# Patient Record
Sex: MALE | Race: WHITE | Employment: OTHER | URBAN - METROPOLITAN AREA
[De-identification: names, ages, dates, MRNs, and addresses within clinical notes are randomized per-mention and may not be internally consistent; named-entity substitution may affect disease eponyms.]

---

## 2024-09-16 ENCOUNTER — APPOINTMENT (OUTPATIENT)
Dept: CT IMAGING | Age: 84
DRG: 084 | End: 2024-09-16
Payer: MEDICARE

## 2024-09-16 ENCOUNTER — APPOINTMENT (OUTPATIENT)
Dept: GENERAL RADIOLOGY | Age: 84
DRG: 084 | End: 2024-09-16
Payer: MEDICARE

## 2024-09-16 ENCOUNTER — HOSPITAL ENCOUNTER (INPATIENT)
Age: 84
LOS: 1 days | Discharge: NURSING, REHAB OR LONG-TERM CARE | DRG: 084 | End: 2024-09-18
Attending: EMERGENCY MEDICINE | Admitting: INTERNAL MEDICINE
Payer: MEDICARE

## 2024-09-16 DIAGNOSIS — T14.8XXA MULTIPLE SKIN TEARS: ICD-10-CM

## 2024-09-16 DIAGNOSIS — S01.81XA FACIAL LACERATION, INITIAL ENCOUNTER: ICD-10-CM

## 2024-09-16 DIAGNOSIS — I60.9 SUBARACHNOID HEMORRHAGE (HCC): ICD-10-CM
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
## 2024-09-16 DIAGNOSIS — I71.40 ABDOMINAL AORTIC ANEURYSM (AAA) GREATER THAN 5.5 CM IN DIAMETER IN MALE (HCC): ICD-10-CM

## 2024-09-16 DIAGNOSIS — F03.C0 SEVERE DEMENTIA WITHOUT BEHAVIORAL DISTURBANCE, PSYCHOTIC DISTURBANCE, MOOD DISTURBANCE, OR ANXIETY, UNSPECIFIED DEMENTIA TYPE (HCC): ICD-10-CM

## 2024-09-16 DIAGNOSIS — S09.90XA CLOSED HEAD INJURY, INITIAL ENCOUNTER: ICD-10-CM

## 2024-09-16 LAB
ALBUMIN SERPL-MCNC: 3.6 G/DL (ref 3.4–5)
ALBUMIN/GLOB SERPL: 1.4 {RATIO} (ref 1.1–2.2)
ALP SERPL-CCNC: 91 U/L (ref 40–129)
ALT SERPL-CCNC: 7 U/L (ref 10–40)
ANION GAP SERPL CALCULATED.3IONS-SCNC: 12 MMOL/L (ref 3–16)
AST SERPL-CCNC: 25 U/L (ref 15–37)
BACTERIA URNS QL MICRO: ABNORMAL /HPF
BASOPHILS # BLD: 0 K/UL (ref 0–0.2)
BASOPHILS NFR BLD: 0.5 %
BILIRUB SERPL-MCNC: 0.4 MG/DL (ref 0–1)
BILIRUB UR QL STRIP.AUTO: NEGATIVE
BUN SERPL-MCNC: 12 MG/DL (ref 7–20)
CALCIUM SERPL-MCNC: 9.3 MG/DL (ref 8.3–10.6)
CHLORIDE SERPL-SCNC: 102 MMOL/L (ref 99–110)
CLARITY UR: CLEAR
CO2 SERPL-SCNC: 23 MMOL/L (ref 21–32)
COLOR UR: YELLOW
CREAT SERPL-MCNC: 0.6 MG/DL (ref 0.8–1.3)
DEPRECATED RDW RBC AUTO: 18.9 % (ref 12.4–15.4)
EOSINOPHIL # BLD: 0.1 K/UL (ref 0–0.6)
EOSINOPHIL NFR BLD: 1.1 %
EPI CELLS #/AREA URNS AUTO: 1 /HPF (ref 0–5)
GFR SERPLBLD CREATININE-BSD FMLA CKD-EPI: >90 ML/MIN/{1.73_M2}
GLUCOSE SERPL-MCNC: 122 MG/DL (ref 70–99)
GLUCOSE UR STRIP.AUTO-MCNC: NEGATIVE MG/DL
HCT VFR BLD AUTO: 33.5 % (ref 40.5–52.5)
HGB BLD-MCNC: 10.4 G/DL (ref 13.5–17.5)
HGB UR QL STRIP.AUTO: NEGATIVE
HYALINE CASTS #/AREA URNS AUTO: 1 /LPF (ref 0–8)
KETONES UR STRIP.AUTO-MCNC: ABNORMAL MG/DL
LEUKOCYTE ESTERASE UR QL STRIP.AUTO: NEGATIVE
LYMPHOCYTES # BLD: 1.2 K/UL (ref 1–5.1)
LYMPHOCYTES NFR BLD: 16.5 %
MCH RBC QN AUTO: 23.9 PG (ref 26–34)
MCHC RBC AUTO-ENTMCNC: 31.1 G/DL (ref 31–36)
MCV RBC AUTO: 77 FL (ref 80–100)
MONOCYTES # BLD: 0.5 K/UL (ref 0–1.3)
MONOCYTES NFR BLD: 6 %
MUCUS: PRESENT
NEUTROPHILS # BLD: 5.7 K/UL (ref 1.7–7.7)
NEUTROPHILS NFR BLD: 75.9 %
NITRITE UR QL STRIP.AUTO: NEGATIVE
PH UR STRIP.AUTO: 6 [PH] (ref 5–8)
PLATELET # BLD AUTO: 249 K/UL (ref 135–450)
PMV BLD AUTO: 7.8 FL (ref 5–10.5)
POTASSIUM SERPL-SCNC: 4.5 MMOL/L (ref 3.5–5.1)
PROT SERPL-MCNC: 6.2 G/DL (ref 6.4–8.2)
PROT UR STRIP.AUTO-MCNC: ABNORMAL MG/DL
RBC # BLD AUTO: 4.35 M/UL (ref 4.2–5.9)
RBC CLUMPS #/AREA URNS AUTO: 3 /HPF (ref 0–4)
SODIUM SERPL-SCNC: 137 MMOL/L (ref 136–145)
SP GR UR STRIP.AUTO: 1.01 (ref 1–1.03)
UA COMPLETE W REFLEX CULTURE PNL UR: ABNORMAL
UA DIPSTICK W REFLEX MICRO PNL UR: YES
URN SPEC COLLECT METH UR: ABNORMAL
UROBILINOGEN UR STRIP-ACNC: 1 E.U./DL
WBC # BLD AUTO: 7.5 K/UL (ref 4–11)
WBC #/AREA URNS AUTO: 5 /HPF (ref 0–5)

## 2024-09-16 PROCEDURE — 6360000002 HC RX W HCPCS: Performed by: EMERGENCY MEDICINE

## 2024-09-16 PROCEDURE — 70450 CT HEAD/BRAIN W/O DYE: CPT

## 2024-09-16 PROCEDURE — 99285 EMERGENCY DEPT VISIT HI MDM: CPT

## 2024-09-16 PROCEDURE — 72125 CT NECK SPINE W/O DYE: CPT

## 2024-09-16 PROCEDURE — 71250 CT THORAX DX C-: CPT

## 2024-09-16 PROCEDURE — 6370000000 HC RX 637 (ALT 250 FOR IP): Performed by: EMERGENCY MEDICINE

## 2024-09-16 PROCEDURE — 6360000004 HC RX CONTRAST MEDICATION: Performed by: EMERGENCY MEDICINE

## 2024-09-16 PROCEDURE — 81001 URINALYSIS AUTO W/SCOPE: CPT

## 2024-09-16 PROCEDURE — G0378 HOSPITAL OBSERVATION PER HR: HCPCS

## 2024-09-16 PROCEDURE — 90471 IMMUNIZATION ADMIN: CPT | Performed by: EMERGENCY MEDICINE

## 2024-09-16 PROCEDURE — 85025 COMPLETE CBC W/AUTO DIFF WBC: CPT

## 2024-09-16 PROCEDURE — 80053 COMPREHEN METABOLIC PANEL: CPT

## 2024-09-16 PROCEDURE — 73080 X-RAY EXAM OF ELBOW: CPT

## 2024-09-16 PROCEDURE — 90715 TDAP VACCINE 7 YRS/> IM: CPT | Performed by: EMERGENCY MEDICINE

## 2024-09-16 PROCEDURE — 74174 CTA ABD&PLVS W/CONTRAST: CPT

## 2024-09-16 PROCEDURE — 71045 X-RAY EXAM CHEST 1 VIEW: CPT

## 2024-09-16 RX ORDER — IOPAMIDOL 755 MG/ML
75 INJECTION, SOLUTION INTRAVASCULAR
Status: COMPLETED | OUTPATIENT
Start: 2024-09-16 | End: 2024-09-16

## 2024-09-16 RX ORDER — POLYETHYLENE GLYCOL 3350 17 G/17G
17 POWDER, FOR SOLUTION ORAL DAILY PRN
Status: DISCONTINUED | OUTPATIENT
Start: 2024-09-16 | End: 2024-09-18 | Stop reason: HOSPADM

## 2024-09-16 RX ORDER — SODIUM CHLORIDE 0.9 % (FLUSH) 0.9 %
5-40 SYRINGE (ML) INJECTION PRN
Status: DISCONTINUED | OUTPATIENT
Start: 2024-09-16 | End: 2024-09-18 | Stop reason: HOSPADM

## 2024-09-16 RX ORDER — MAGNESIUM SULFATE IN WATER 40 MG/ML
2000 INJECTION, SOLUTION INTRAVENOUS PRN
Status: DISCONTINUED | OUTPATIENT
Start: 2024-09-16 | End: 2024-09-18 | Stop reason: HOSPADM

## 2024-09-16 RX ORDER — ACETAMINOPHEN 650 MG/1
650 SUPPOSITORY RECTAL EVERY 6 HOURS PRN
Status: DISCONTINUED | OUTPATIENT
Start: 2024-09-16 | End: 2024-09-18 | Stop reason: HOSPADM

## 2024-09-16 RX ORDER — SODIUM CHLORIDE 9 MG/ML
INJECTION, SOLUTION INTRAVENOUS PRN
Status: DISCONTINUED | OUTPATIENT
Start: 2024-09-16 | End: 2024-09-18 | Stop reason: HOSPADM

## 2024-09-16 RX ORDER — ONDANSETRON 2 MG/ML
4 INJECTION INTRAMUSCULAR; INTRAVENOUS EVERY 6 HOURS PRN
Status: DISCONTINUED | OUTPATIENT
Start: 2024-09-16 | End: 2024-09-18 | Stop reason: HOSPADM

## 2024-09-16 RX ORDER — SODIUM CHLORIDE 0.9 % (FLUSH) 0.9 %
5-40 SYRINGE (ML) INJECTION EVERY 12 HOURS SCHEDULED
Status: DISCONTINUED | OUTPATIENT
Start: 2024-09-17 | End: 2024-09-18 | Stop reason: HOSPADM

## 2024-09-16 RX ORDER — POTASSIUM CHLORIDE 7.45 MG/ML
10 INJECTION INTRAVENOUS PRN
Status: DISCONTINUED | OUTPATIENT
Start: 2024-09-16 | End: 2024-09-18 | Stop reason: HOSPADM

## 2024-09-16 RX ORDER — ACETAMINOPHEN 325 MG/1
650 TABLET ORAL EVERY 6 HOURS PRN
Status: DISCONTINUED | OUTPATIENT
Start: 2024-09-16 | End: 2024-09-18 | Stop reason: HOSPADM

## 2024-09-16 RX ORDER — POTASSIUM CHLORIDE 1500 MG/1
40 TABLET, EXTENDED RELEASE ORAL PRN
Status: DISCONTINUED | OUTPATIENT
Start: 2024-09-16 | End: 2024-09-18 | Stop reason: HOSPADM

## 2024-09-16 RX ORDER — NEOMYCIN/BACITRACIN/POLYMYXINB 3.5-400-5K
OINTMENT (GRAM) TOPICAL 2 TIMES DAILY
Status: DISCONTINUED | OUTPATIENT
Start: 2024-09-16 | End: 2024-09-18 | Stop reason: HOSPADM

## 2024-09-16 RX ORDER — ONDANSETRON 4 MG/1
4 TABLET, ORALLY DISINTEGRATING ORAL EVERY 8 HOURS PRN
Status: DISCONTINUED | OUTPATIENT
Start: 2024-09-16 | End: 2024-09-18 | Stop reason: HOSPADM

## 2024-09-16 RX ADMIN — BACITRACIN ZINC, NEOMYCIN, POLYMYXIN B SULFAT: 5000; 3.5; 4 OINTMENT TOPICAL at 22:28

## 2024-09-16 RX ADMIN — IOPAMIDOL 75 ML: 755 INJECTION, SOLUTION INTRAVENOUS at 16:34

## 2024-09-16 RX ADMIN — TETANUS TOXOID, REDUCED DIPHTHERIA TOXOID AND ACELLULAR PERTUSSIS VACCINE, ADSORBED 0.5 ML: 5; 2.5; 8; 8; 2.5 SUSPENSION INTRAMUSCULAR at 22:29

## 2024-09-16 ASSESSMENT — PAIN - FUNCTIONAL ASSESSMENT: PAIN_FUNCTIONAL_ASSESSMENT: 0-10

## 2024-09-16 ASSESSMENT — PAIN SCALES - GENERAL: PAINLEVEL_OUTOF10: 0

## 2024-09-17 ENCOUNTER — APPOINTMENT (OUTPATIENT)
Dept: GENERAL RADIOLOGY | Age: 84
DRG: 084 | End: 2024-09-17
Payer: MEDICARE

## 2024-09-17 ENCOUNTER — APPOINTMENT (OUTPATIENT)
Dept: CT IMAGING | Age: 84
DRG: 084 | End: 2024-09-17
Payer: MEDICARE

## 2024-09-17 LAB
ALBUMIN SERPL-MCNC: 3.5 G/DL (ref 3.4–5)
ALP SERPL-CCNC: 83 U/L (ref 40–129)
ALT SERPL-CCNC: 7 U/L (ref 10–40)
ANION GAP SERPL CALCULATED.3IONS-SCNC: 10 MMOL/L (ref 3–16)
AST SERPL-CCNC: 22 U/L (ref 15–37)
BASOPHILS # BLD: 0 K/UL (ref 0–0.2)
BASOPHILS NFR BLD: 0.4 %
BILIRUB DIRECT SERPL-MCNC: 0.2 MG/DL (ref 0–0.3)
BILIRUB INDIRECT SERPL-MCNC: 0.4 MG/DL (ref 0–1)
BILIRUB SERPL-MCNC: 0.6 MG/DL (ref 0–1)
BUN SERPL-MCNC: 13 MG/DL (ref 7–20)
CALCIUM SERPL-MCNC: 8.9 MG/DL (ref 8.3–10.6)
CHLORIDE SERPL-SCNC: 102 MMOL/L (ref 99–110)
CO2 SERPL-SCNC: 24 MMOL/L (ref 21–32)
CREAT SERPL-MCNC: <0.5 MG/DL (ref 0.8–1.3)
DEPRECATED RDW RBC AUTO: 18.5 % (ref 12.4–15.4)
EOSINOPHIL # BLD: 0 K/UL (ref 0–0.6)
EOSINOPHIL NFR BLD: 0.4 %
GFR SERPLBLD CREATININE-BSD FMLA CKD-EPI: >90 ML/MIN/{1.73_M2}
GLUCOSE SERPL-MCNC: 113 MG/DL (ref 70–99)
HCT VFR BLD AUTO: 28.7 % (ref 40.5–52.5)
HGB BLD-MCNC: 9.5 G/DL (ref 13.5–17.5)
LYMPHOCYTES # BLD: 2 K/UL (ref 1–5.1)
LYMPHOCYTES NFR BLD: 20.1 %
MCH RBC QN AUTO: 24.7 PG (ref 26–34)
MCHC RBC AUTO-ENTMCNC: 33.1 G/DL (ref 31–36)
MCV RBC AUTO: 74.7 FL (ref 80–100)
MONOCYTES # BLD: 0.9 K/UL (ref 0–1.3)
MONOCYTES NFR BLD: 9.2 %
NEUTROPHILS # BLD: 6.9 K/UL (ref 1.7–7.7)
NEUTROPHILS NFR BLD: 69.9 %
PLATELET # BLD AUTO: 201 K/UL (ref 135–450)
PMV BLD AUTO: 7.4 FL (ref 5–10.5)
POTASSIUM SERPL-SCNC: 3.9 MMOL/L (ref 3.5–5.1)
PROT SERPL-MCNC: 6 G/DL (ref 6.4–8.2)
RBC # BLD AUTO: 3.84 M/UL (ref 4.2–5.9)
SODIUM SERPL-SCNC: 136 MMOL/L (ref 136–145)
WBC # BLD AUTO: 9.8 K/UL (ref 4–11)

## 2024-09-17 PROCEDURE — 74230 X-RAY XM SWLNG FUNCJ C+: CPT

## 2024-09-17 PROCEDURE — 92610 EVALUATE SWALLOWING FUNCTION: CPT

## 2024-09-17 PROCEDURE — 97116 GAIT TRAINING THERAPY: CPT | Performed by: PHYSICAL THERAPIST

## 2024-09-17 PROCEDURE — 97129 THER IVNTJ 1ST 15 MIN: CPT

## 2024-09-17 PROCEDURE — 80048 BASIC METABOLIC PNL TOTAL CA: CPT

## 2024-09-17 PROCEDURE — 70450 CT HEAD/BRAIN W/O DYE: CPT

## 2024-09-17 PROCEDURE — 2580000003 HC RX 258: Performed by: HOSPITALIST

## 2024-09-17 PROCEDURE — 6370000000 HC RX 637 (ALT 250 FOR IP): Performed by: INTERNAL MEDICINE

## 2024-09-17 PROCEDURE — 92611 MOTION FLUOROSCOPY/SWALLOW: CPT

## 2024-09-17 PROCEDURE — 97162 PT EVAL MOD COMPLEX 30 MIN: CPT | Performed by: PHYSICAL THERAPIST

## 2024-09-17 PROCEDURE — 97166 OT EVAL MOD COMPLEX 45 MIN: CPT

## 2024-09-17 PROCEDURE — 36415 COLL VENOUS BLD VENIPUNCTURE: CPT

## 2024-09-17 PROCEDURE — 85025 COMPLETE CBC W/AUTO DIFF WBC: CPT

## 2024-09-17 PROCEDURE — G0378 HOSPITAL OBSERVATION PER HR: HCPCS

## 2024-09-17 PROCEDURE — 94760 N-INVAS EAR/PLS OXIMETRY 1: CPT

## 2024-09-17 PROCEDURE — 92523 SPEECH SOUND LANG COMPREHEN: CPT

## 2024-09-17 PROCEDURE — 6370000000 HC RX 637 (ALT 250 FOR IP): Performed by: HOSPITALIST

## 2024-09-17 PROCEDURE — 80076 HEPATIC FUNCTION PANEL: CPT

## 2024-09-17 RX ORDER — LISINOPRIL 20 MG/1
20 TABLET ORAL DAILY
Status: DISCONTINUED | OUTPATIENT
Start: 2024-09-17 | End: 2024-09-18 | Stop reason: HOSPADM

## 2024-09-17 RX ORDER — AMLODIPINE BESYLATE 5 MG/1
5 TABLET ORAL DAILY
Status: DISCONTINUED | OUTPATIENT
Start: 2024-09-17 | End: 2024-09-18 | Stop reason: HOSPADM

## 2024-09-17 RX ORDER — ATORVASTATIN CALCIUM 80 MG/1
80 TABLET, FILM COATED ORAL DAILY
Status: ON HOLD | COMMUNITY
End: 2024-09-17

## 2024-09-17 RX ORDER — LISINOPRIL AND HYDROCHLOROTHIAZIDE 12.5; 2 MG/1; MG/1
1 TABLET ORAL DAILY
Status: ON HOLD | COMMUNITY
End: 2024-09-17

## 2024-09-17 RX ORDER — AMLODIPINE BESYLATE 5 MG/1
5 TABLET ORAL DAILY
Status: ON HOLD | COMMUNITY
End: 2024-09-17

## 2024-09-17 RX ORDER — FUROSEMIDE 20 MG
20 TABLET ORAL 2 TIMES DAILY
Status: ON HOLD | COMMUNITY
End: 2024-09-17

## 2024-09-17 RX ORDER — DOXYCYCLINE 100 MG/1
100 CAPSULE ORAL 2 TIMES DAILY
Status: ON HOLD | COMMUNITY
End: 2024-09-17

## 2024-09-17 RX ORDER — ASPIRIN 81 MG/1
81 TABLET, CHEWABLE ORAL DAILY
Status: ON HOLD | COMMUNITY
End: 2024-09-17

## 2024-09-17 RX ORDER — ESCITALOPRAM OXALATE 10 MG/1
10 TABLET ORAL DAILY
Status: ON HOLD | COMMUNITY
End: 2024-09-17

## 2024-09-17 RX ORDER — M-VIT,TX,IRON,MINS/CALC/FOLIC 27MG-0.4MG
1 TABLET ORAL DAILY
Status: ON HOLD | COMMUNITY
End: 2024-09-17

## 2024-09-17 RX ADMIN — BACITRACIN ZINC, NEOMYCIN, POLYMYXIN B SULFAT: 5000; 3.5; 4 OINTMENT TOPICAL at 20:10

## 2024-09-17 RX ADMIN — BACITRACIN ZINC, NEOMYCIN, POLYMYXIN B SULFAT: 5000; 3.5; 4 OINTMENT TOPICAL at 09:01

## 2024-09-17 RX ADMIN — AMLODIPINE BESYLATE 5 MG: 5 TABLET ORAL at 10:24

## 2024-09-17 RX ADMIN — Medication 10 ML: at 20:11

## 2024-09-17 RX ADMIN — Medication 10 ML: at 07:54

## 2024-09-17 RX ADMIN — LISINOPRIL 20 MG: 20 TABLET ORAL at 10:24

## 2024-09-17 ASSESSMENT — PAIN SCALES - WONG BAKER: WONGBAKER_NUMERICALRESPONSE: NO HURT

## 2024-09-18 VITALS
DIASTOLIC BLOOD PRESSURE: 71 MMHG | HEIGHT: 75 IN | SYSTOLIC BLOOD PRESSURE: 133 MMHG | BODY MASS INDEX: 14.99 KG/M2 | WEIGHT: 120.59 LBS | OXYGEN SATURATION: 96 % | TEMPERATURE: 97.5 F | RESPIRATION RATE: 14 BRPM | HEART RATE: 56 BPM

## 2024-09-18 PROBLEM — I60.9 SUBARACHNOID HEMORRHAGE (HCC): Status: ACTIVE | Noted: 2024-09-18

## 2024-09-18 PROCEDURE — 97116 GAIT TRAINING THERAPY: CPT | Performed by: PHYSICAL THERAPIST

## 2024-09-18 PROCEDURE — 99232 SBSQ HOSP IP/OBS MODERATE 35: CPT | Performed by: SURGERY

## 2024-09-18 PROCEDURE — 94760 N-INVAS EAR/PLS OXIMETRY 1: CPT

## 2024-09-18 PROCEDURE — 1200000000 HC SEMI PRIVATE

## 2024-09-18 PROCEDURE — 97530 THERAPEUTIC ACTIVITIES: CPT | Performed by: PHYSICAL THERAPIST

## 2024-09-18 PROCEDURE — 6370000000 HC RX 637 (ALT 250 FOR IP): Performed by: INTERNAL MEDICINE

## 2024-09-18 PROCEDURE — 2580000003 HC RX 258: Performed by: HOSPITALIST

## 2024-09-18 PROCEDURE — 6370000000 HC RX 637 (ALT 250 FOR IP): Performed by: HOSPITALIST

## 2024-09-18 RX ORDER — LISINOPRIL 10 MG/1
10 TABLET ORAL DAILY
Qty: 30 TABLET | Refills: 2 | Status: SHIPPED | OUTPATIENT
Start: 2024-09-19

## 2024-09-18 RX ADMIN — Medication 20 ML: at 08:57

## 2024-09-18 RX ADMIN — LISINOPRIL 20 MG: 20 TABLET ORAL at 08:55

## 2024-09-18 RX ADMIN — AMLODIPINE BESYLATE 5 MG: 5 TABLET ORAL at 08:55

## 2024-09-18 RX ADMIN — BACITRACIN ZINC, NEOMYCIN, POLYMYXIN B SULFAT: 5000; 3.5; 4 OINTMENT TOPICAL at 08:55

## 2024-09-18 ASSESSMENT — PAIN SCALES - GENERAL: PAINLEVEL_OUTOF10: 0

## 2024-09-18 ASSESSMENT — PAIN SCALES - WONG BAKER: WONGBAKER_NUMERICALRESPONSE: NO HURT

## 2024-10-03 ENCOUNTER — TELEPHONE (OUTPATIENT)
Dept: VASCULAR SURGERY | Age: 84
End: 2024-10-03

## 2024-10-03 NOTE — TELEPHONE ENCOUNTER
Patient's nephew called to schedule an appt with Dr Wheeler. Patient saw him in the hospital and the nephew has a lot of questions. Please reach out to Konrad at [phone].

## 2024-10-23 ENCOUNTER — OFFICE VISIT (OUTPATIENT)
Dept: VASCULAR SURGERY | Age: 84
End: 2024-10-23
Payer: MEDICARE

## 2024-10-23 VITALS
WEIGHT: 128 LBS | BODY MASS INDEX: 15.92 KG/M2 | HEART RATE: 62 BPM | HEIGHT: 75 IN | DIASTOLIC BLOOD PRESSURE: 61 MMHG | SYSTOLIC BLOOD PRESSURE: 141 MMHG

## 2024-10-23 DIAGNOSIS — I71.43 INFRARENAL ABDOMINAL AORTIC ANEURYSM (AAA) WITHOUT RUPTURE (HCC): Primary | ICD-10-CM

## 2024-10-23 PROCEDURE — 1123F ACP DISCUSS/DSCN MKR DOCD: CPT | Performed by: SURGERY

## 2024-10-23 PROCEDURE — 1159F MED LIST DOCD IN RCRD: CPT | Performed by: SURGERY

## 2024-10-23 PROCEDURE — 99213 OFFICE O/P EST LOW 20 MIN: CPT | Performed by: SURGERY

## 2024-10-23 PROCEDURE — G8427 DOCREV CUR MEDS BY ELIG CLIN: HCPCS | Performed by: SURGERY

## 2024-10-23 PROCEDURE — 1036F TOBACCO NON-USER: CPT | Performed by: SURGERY

## 2024-10-23 PROCEDURE — G8419 CALC BMI OUT NRM PARAM NOF/U: HCPCS | Performed by: SURGERY

## 2024-10-23 PROCEDURE — G8484 FLU IMMUNIZE NO ADMIN: HCPCS | Performed by: SURGERY

## 2024-10-23 NOTE — PROGRESS NOTES
F/U appt after inpt consultation for AAA. 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  Pt denies abdominal pain.  Ongoing dementia living in a memory care unit.  Accompanied today by his nephew.  While hospitalized decisions were discussed with his son Fabian.    Past Medical History:   Diagnosis Date    Smoking 08/27/2024    POA provided information     No past surgical history on file.  No Known Allergies  Current Outpatient Medications   Medication Sig Dispense Refill    lisinopril (PRINIVIL;ZESTRIL) 10 MG tablet Take 1 tablet by mouth daily 30 tablet 2     No current facility-administered medications for this visit.     Social History     Socioeconomic History    Marital status: Unknown     Spouse name: Not on file    Number of children: Not on file    Years of education: Not on file    Highest education level: Not on file   Occupational History    Not on file   Tobacco Use    Smoking status: Former     Types: Cigarettes    Smokeless tobacco: Never   Substance and Sexual Activity    Alcohol use: Not on file    Drug use: Not on file    Sexual activity: Not on file   Other Topics Concern    Not on file   Social History Narrative    Not on file     Social Determinants of Health     Financial Resource Strain: Not on file   Food Insecurity: Unknown (1/21/2024)    Received from [facility]    Food Insecurities     Worried about running out of food: Not on file     Food Bought: Not on file   Transportation Needs: Unknown (1/21/2024)    Received from [facility]    Transportation     Worried about transportation: Not on file   Physical Activity: Not on file   Stress: Not on file   Social Connections: Not on file   Intimate Partner Violence: Unknown (1/21/2024)    Received from [facility]    Interpersonal Safety     Feel physically or emotionally unsafe where currently live: Not on file     Harm by anyone: Not on file

## 2024-10-30 ENCOUNTER — TELEPHONE (OUTPATIENT)
Dept: VASCULAR SURGERY | Age: 84
End: 2024-10-30

## 2024-10-30 NOTE — TELEPHONE ENCOUNTER
Pt's nephew, Konrad, called with their decision regarding the pt's care. Per Konrad, he has discussed this at length with pt's family and they have decided to take Dr. Wheeler's recommendation and not proceed with any treatment at this time. I advised Konrad to call us back should they need anything in the future.

## 2024-12-25 ENCOUNTER — APPOINTMENT (OUTPATIENT)
Dept: CT IMAGING | Age: 84
End: 2024-12-25
Payer: MEDICARE

## 2024-12-25 ENCOUNTER — HOSPITAL ENCOUNTER (EMERGENCY)
Age: 84
Discharge: HOME | End: 2024-12-25
Attending: STUDENT IN AN ORGANIZED HEALTH CARE EDUCATION/TRAINING PROGRAM
Payer: MEDICARE

## 2024-12-25 ENCOUNTER — APPOINTMENT (OUTPATIENT)
Dept: GENERAL RADIOLOGY | Age: 84
End: 2024-12-25
Payer: MEDICARE

## 2024-12-25 VITALS
HEIGHT: 75 IN | TEMPERATURE: 98 F | SYSTOLIC BLOOD PRESSURE: 159 MMHG | WEIGHT: 144.18 LBS | HEART RATE: 62 BPM | RESPIRATION RATE: 15 BRPM | BODY MASS INDEX: 17.93 KG/M2 | DIASTOLIC BLOOD PRESSURE: 73 MMHG | OXYGEN SATURATION: 98 %

## 2024-12-25 DIAGNOSIS — Z86.59 HISTORY OF DEMENTIA: ICD-10-CM
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
## 2024-12-25 LAB
ANION GAP SERPL CALCULATED.3IONS-SCNC: 10 MMOL/L (ref 3–16)
BASOPHILS # BLD: 0 K/UL (ref 0–0.2)
BASOPHILS NFR BLD: 0.4 %
BUN SERPL-MCNC: 20 MG/DL (ref 7–20)
CALCIUM SERPL-MCNC: 9.7 MG/DL (ref 8.3–10.6)
CHLORIDE SERPL-SCNC: 103 MMOL/L (ref 99–110)
CO2 SERPL-SCNC: 27 MMOL/L (ref 21–32)
CREAT SERPL-MCNC: 0.6 MG/DL (ref 0.8–1.3)
DEPRECATED RDW RBC AUTO: 18.7 % (ref 12.4–15.4)
EOSINOPHIL # BLD: 0.3 K/UL (ref 0–0.6)
EOSINOPHIL NFR BLD: 3.9 %
GFR SERPLBLD CREATININE-BSD FMLA CKD-EPI: >90 ML/MIN/{1.73_M2}
GLUCOSE SERPL-MCNC: 90 MG/DL (ref 70–99)
HCT VFR BLD AUTO: 40.9 % (ref 40.5–52.5)
HGB BLD-MCNC: 13 G/DL (ref 13.5–17.5)
LYMPHOCYTES # BLD: 3.3 K/UL (ref 1–5.1)
LYMPHOCYTES NFR BLD: 45 %
MCH RBC QN AUTO: 23.9 PG (ref 26–34)
MCHC RBC AUTO-ENTMCNC: 31.9 G/DL (ref 31–36)
MCV RBC AUTO: 75.1 FL (ref 80–100)
MONOCYTES # BLD: 0.6 K/UL (ref 0–1.3)
MONOCYTES NFR BLD: 7.9 %
NEUTROPHILS # BLD: 3.1 K/UL (ref 1.7–7.7)
NEUTROPHILS NFR BLD: 42.8 %
PLATELET # BLD AUTO: 230 K/UL (ref 135–450)
PMV BLD AUTO: 7.8 FL (ref 5–10.5)
POTASSIUM SERPL-SCNC: 4.3 MMOL/L (ref 3.5–5.1)
RBC # BLD AUTO: 5.44 M/UL (ref 4.2–5.9)
SODIUM SERPL-SCNC: 140 MMOL/L (ref 136–145)
WBC # BLD AUTO: 7.3 K/UL (ref 4–11)

## 2024-12-25 PROCEDURE — 80048 BASIC METABOLIC PNL TOTAL CA: CPT

## 2024-12-25 PROCEDURE — 70450 CT HEAD/BRAIN W/O DYE: CPT

## 2024-12-25 PROCEDURE — 85025 COMPLETE CBC W/AUTO DIFF WBC: CPT

## 2024-12-25 PROCEDURE — 99284 EMERGENCY DEPT VISIT MOD MDM: CPT

## 2024-12-25 PROCEDURE — 72170 X-RAY EXAM OF PELVIS: CPT

## 2024-12-25 ASSESSMENT — PAIN SCALES - GENERAL: PAINLEVEL_OUTOF10: 0

## 2024-12-25 ASSESSMENT — PAIN - FUNCTIONAL ASSESSMENT: PAIN_FUNCTIONAL_ASSESSMENT: 0-10

## 2024-12-25 ASSESSMENT — LIFESTYLE VARIABLES
HOW MANY STANDARD DRINKS CONTAINING ALCOHOL DO YOU HAVE ON A TYPICAL DAY: PATIENT DOES NOT DRINK
HOW OFTEN DO YOU HAVE A DRINK CONTAINING ALCOHOL: NEVER

## 2024-12-25 NOTE — DISCHARGE INSTRUCTIONS
You were seen today in the emergency department due to an accidental fall.  Your workup including blood work and CT scan and x-ray was reassuring and did not show any evidence of acute injury.  It is recommended that you continue to take your medications as prescribed.  Please follow-up with your regular doctor as scheduled.  Please return to the emergency department if you experience any further concerning symptoms.

## 2024-12-25 NOTE — ED PROVIDER NOTES
spent performing procedures).       DISCHARGE PRESCRIPTIONS: (None if blank)  New Prescriptions    No medications on file         I am the primary clinician of record.     FINAL IMPRESSION      1. Accidental fall, initial encounter    2. History of dementia            DISPOSITION/PLAN   DISPOSITION                 OUTPATIENT FOLLOW UP THE PATIENT:  No follow-up provider specified.      Electronically Signed: Alphonso Lui DO, 12/25/24, 9:33 AM    This report has been produced using speech recognition software and may contain errors related to that system including errors in grammar, punctuation, and spelling, as well as words and phrases that may be inappropriate. If there are any questions or concerns please feel free to contact the dictating provider for clarification.      Alphonso Lui,   12/26/24 0738

## 2025-04-16 ENCOUNTER — APPOINTMENT (OUTPATIENT)
Dept: GENERAL RADIOLOGY | Age: 85
End: 2025-04-16
Payer: MEDICARE

## 2025-04-16 ENCOUNTER — APPOINTMENT (OUTPATIENT)
Dept: CT IMAGING | Age: 85
End: 2025-04-16
Payer: MEDICARE

## 2025-04-16 ENCOUNTER — APPOINTMENT (OUTPATIENT)
Dept: CT IMAGING | Age: 85
DRG: 963 | End: 2025-04-16
Payer: MEDICARE

## 2025-04-16 ENCOUNTER — HOSPITAL ENCOUNTER (INPATIENT)
Age: 85
LOS: 5 days | Discharge: HOSPICE | DRG: 963 | End: 2025-04-21
Payer: MEDICARE

## 2025-04-16 ENCOUNTER — HOSPITAL ENCOUNTER (EMERGENCY)
Age: 85
Discharge: ANOTHER HOSPITAL | End: 2025-04-16
Attending: STUDENT IN AN ORGANIZED HEALTH CARE EDUCATION/TRAINING PROGRAM
Payer: MEDICARE

## 2025-04-16 DIAGNOSIS — R93.89 ABNORMAL FINDING ON CT SCAN: ICD-10-CM

## 2025-04-16 DIAGNOSIS — S09.90XA CLOSED HEAD INJURY, INITIAL ENCOUNTER: Primary | ICD-10-CM

## 2025-04-16 DIAGNOSIS — R74.8 ELEVATED CK: ICD-10-CM

## 2025-04-16 DIAGNOSIS — Z51.5 HOSPICE CARE PATIENT: ICD-10-CM

## 2025-04-16 DIAGNOSIS — S06.5XAA SUBDURAL HEMATOMA (HCC): ICD-10-CM

## 2025-04-16 DIAGNOSIS — I60.9 SAH (SUBARACHNOID HEMORRHAGE) (HCC): Primary | ICD-10-CM

## 2025-04-16 DIAGNOSIS — R79.89 ELEVATED TROPONIN I LEVEL: ICD-10-CM

## 2025-04-16 DIAGNOSIS — Z71.89 GOALS OF CARE, COUNSELING/DISCUSSION: ICD-10-CM

## 2025-04-16 DIAGNOSIS — S06.33AA: ICD-10-CM

## 2025-04-16 DIAGNOSIS — I62.9 INTRACRANIAL BLEED (HCC): ICD-10-CM

## 2025-04-16 LAB
ALBUMIN SERPL-MCNC: 4.1 G/DL (ref 3.4–5)
ALBUMIN/GLOB SERPL: 1.4 {RATIO} (ref 1.1–2.2)
ALP SERPL-CCNC: 107 U/L (ref 40–129)
ALT SERPL-CCNC: 41 U/L (ref 10–40)
ANION GAP SERPL CALCULATED.3IONS-SCNC: 15 MMOL/L (ref 3–16)
AST SERPL-CCNC: 103 U/L (ref 15–37)
BASOPHILS # BLD: 0 K/UL (ref 0–0.2)
BASOPHILS NFR BLD: 0.1 %
BILIRUB SERPL-MCNC: 0.8 MG/DL (ref 0–1)
BUN SERPL-MCNC: 28 MG/DL (ref 7–20)
CALCIUM SERPL-MCNC: 9.8 MG/DL (ref 8.3–10.6)
CHLORIDE SERPL-SCNC: 100 MMOL/L (ref 99–110)
CK SERPL-CCNC: 1029 U/L (ref 39–308)
CO2 SERPL-SCNC: 24 MMOL/L (ref 21–32)
CREAT SERPL-MCNC: 0.8 MG/DL (ref 0.8–1.3)
CRP SERPL-MCNC: 38.7 MG/L (ref 0–5.1)
DEPRECATED RDW RBC AUTO: 19 % (ref 12.4–15.4)
EKG ATRIAL RATE: 72 BPM
EKG DIAGNOSIS: NORMAL
EKG P AXIS: 85 DEGREES
EKG P-R INTERVAL: 180 MS
EKG Q-T INTERVAL: 448 MS
EKG QRS DURATION: 106 MS
EKG QTC CALCULATION (BAZETT): 490 MS
EKG R AXIS: 88 DEGREES
EKG T AXIS: 89 DEGREES
EKG VENTRICULAR RATE: 72 BPM
EOSINOPHIL # BLD: 0 K/UL (ref 0–0.6)
EOSINOPHIL NFR BLD: 0 %
GFR SERPLBLD CREATININE-BSD FMLA CKD-EPI: 88 ML/MIN/{1.73_M2}
GLUCOSE SERPL-MCNC: 153 MG/DL (ref 70–99)
HCT VFR BLD AUTO: 36.7 % (ref 40.5–52.5)
HGB BLD-MCNC: 11.7 G/DL (ref 13.5–17.5)
LACTATE BLDV-SCNC: 3.3 MMOL/L (ref 0.4–1.9)
LYMPHOCYTES # BLD: 1 K/UL (ref 1–5.1)
LYMPHOCYTES NFR BLD: 7.8 %
MCH RBC QN AUTO: 25 PG (ref 26–34)
MCHC RBC AUTO-ENTMCNC: 31.8 G/DL (ref 31–36)
MCV RBC AUTO: 78.5 FL (ref 80–100)
MONOCYTES # BLD: 1.1 K/UL (ref 0–1.3)
MONOCYTES NFR BLD: 8.8 %
NEUTROPHILS # BLD: 10.5 K/UL (ref 1.7–7.7)
NEUTROPHILS NFR BLD: 83.3 %
PLATELET # BLD AUTO: 227 K/UL (ref 135–450)
PMV BLD AUTO: 7.3 FL (ref 5–10.5)
POTASSIUM SERPL-SCNC: 4.9 MMOL/L (ref 3.5–5.1)
PROCALCITONIN SERPL IA-MCNC: 0.4 NG/ML (ref 0–0.15)
PROT SERPL-MCNC: 7.1 G/DL (ref 6.4–8.2)
RBC # BLD AUTO: 4.67 M/UL (ref 4.2–5.9)
SODIUM SERPL-SCNC: 139 MMOL/L (ref 136–145)
TROPONIN, HIGH SENSITIVITY: 1203 NG/L (ref 0–22)
TROPONIN, HIGH SENSITIVITY: 1479 NG/L (ref 0–22)
WBC # BLD AUTO: 12.6 K/UL (ref 4–11)

## 2025-04-16 PROCEDURE — 70450 CT HEAD/BRAIN W/O DYE: CPT

## 2025-04-16 PROCEDURE — 2580000003 HC RX 258: Performed by: INTERNAL MEDICINE

## 2025-04-16 PROCEDURE — 84484 ASSAY OF TROPONIN QUANT: CPT

## 2025-04-16 PROCEDURE — 72125 CT NECK SPINE W/O DYE: CPT

## 2025-04-16 PROCEDURE — 6360000004 HC RX CONTRAST MEDICATION: Performed by: STUDENT IN AN ORGANIZED HEALTH CARE EDUCATION/TRAINING PROGRAM

## 2025-04-16 PROCEDURE — 83605 ASSAY OF LACTIC ACID: CPT

## 2025-04-16 PROCEDURE — 71260 CT THORAX DX C+: CPT

## 2025-04-16 PROCEDURE — 80053 COMPREHEN METABOLIC PANEL: CPT

## 2025-04-16 PROCEDURE — 96374 THER/PROPH/DIAG INJ IV PUSH: CPT

## 2025-04-16 PROCEDURE — 85025 COMPLETE CBC W/AUTO DIFF WBC: CPT

## 2025-04-16 PROCEDURE — 36415 COLL VENOUS BLD VENIPUNCTURE: CPT

## 2025-04-16 PROCEDURE — 93010 ELECTROCARDIOGRAM REPORT: CPT | Performed by: INTERNAL MEDICINE

## 2025-04-16 PROCEDURE — 73503 X-RAY EXAM HIP UNI 4/> VIEWS: CPT

## 2025-04-16 PROCEDURE — 2500000003 HC RX 250 WO HCPCS: Performed by: INTERNAL MEDICINE

## 2025-04-16 PROCEDURE — 6360000002 HC RX W HCPCS: Performed by: NURSE PRACTITIONER

## 2025-04-16 PROCEDURE — 93005 ELECTROCARDIOGRAM TRACING: CPT | Performed by: NURSE PRACTITIONER

## 2025-04-16 PROCEDURE — 96375 TX/PRO/DX INJ NEW DRUG ADDON: CPT

## 2025-04-16 PROCEDURE — 71101 X-RAY EXAM UNILAT RIBS/CHEST: CPT

## 2025-04-16 PROCEDURE — 86140 C-REACTIVE PROTEIN: CPT

## 2025-04-16 PROCEDURE — 2060000000 HC ICU INTERMEDIATE R&B

## 2025-04-16 PROCEDURE — 84145 PROCALCITONIN (PCT): CPT

## 2025-04-16 PROCEDURE — 99285 EMERGENCY DEPT VISIT HI MDM: CPT

## 2025-04-16 PROCEDURE — 82550 ASSAY OF CK (CPK): CPT

## 2025-04-16 PROCEDURE — 6370000000 HC RX 637 (ALT 250 FOR IP): Performed by: INTERNAL MEDICINE

## 2025-04-16 PROCEDURE — 94760 N-INVAS EAR/PLS OXIMETRY 1: CPT

## 2025-04-16 RX ORDER — SODIUM CHLORIDE 0.9 % (FLUSH) 0.9 %
5-40 SYRINGE (ML) INJECTION PRN
Status: DISCONTINUED | OUTPATIENT
Start: 2025-04-16 | End: 2025-04-21 | Stop reason: HOSPADM

## 2025-04-16 RX ORDER — ACETAMINOPHEN 325 MG/1
650 TABLET ORAL EVERY 6 HOURS PRN
Status: DISCONTINUED | OUTPATIENT
Start: 2025-04-16 | End: 2025-04-21 | Stop reason: HOSPADM

## 2025-04-16 RX ORDER — IOPAMIDOL 755 MG/ML
75 INJECTION, SOLUTION INTRAVASCULAR
Status: COMPLETED | OUTPATIENT
Start: 2025-04-16 | End: 2025-04-16

## 2025-04-16 RX ORDER — POLYETHYLENE GLYCOL 3350 17 G/17G
17 POWDER, FOR SOLUTION ORAL DAILY PRN
Status: DISCONTINUED | OUTPATIENT
Start: 2025-04-16 | End: 2025-04-21 | Stop reason: HOSPADM

## 2025-04-16 RX ORDER — ACETAMINOPHEN 650 MG/1
650 SUPPOSITORY RECTAL EVERY 6 HOURS PRN
Status: DISCONTINUED | OUTPATIENT
Start: 2025-04-16 | End: 2025-04-21 | Stop reason: HOSPADM

## 2025-04-16 RX ORDER — LORAZEPAM 2 MG/ML
0.5 INJECTION INTRAMUSCULAR
Status: DISCONTINUED | OUTPATIENT
Start: 2025-04-16 | End: 2025-04-18 | Stop reason: SDUPTHER

## 2025-04-16 RX ORDER — FENTANYL CITRATE 50 UG/ML
50 INJECTION, SOLUTION INTRAMUSCULAR; INTRAVENOUS ONCE
Status: COMPLETED | OUTPATIENT
Start: 2025-04-16 | End: 2025-04-16

## 2025-04-16 RX ORDER — SODIUM CHLORIDE 0.9 % (FLUSH) 0.9 %
5-40 SYRINGE (ML) INJECTION EVERY 12 HOURS SCHEDULED
Status: DISCONTINUED | OUTPATIENT
Start: 2025-04-16 | End: 2025-04-21 | Stop reason: HOSPADM

## 2025-04-16 RX ORDER — ONDANSETRON 4 MG/1
4 TABLET, ORALLY DISINTEGRATING ORAL EVERY 8 HOURS PRN
Status: DISCONTINUED | OUTPATIENT
Start: 2025-04-16 | End: 2025-04-21 | Stop reason: HOSPADM

## 2025-04-16 RX ORDER — SODIUM CHLORIDE 9 MG/ML
INJECTION, SOLUTION INTRAVENOUS CONTINUOUS
Status: ACTIVE | OUTPATIENT
Start: 2025-04-16 | End: 2025-04-17

## 2025-04-16 RX ORDER — LISINOPRIL 10 MG/1
10 TABLET ORAL DAILY
Status: DISCONTINUED | OUTPATIENT
Start: 2025-04-17 | End: 2025-04-21 | Stop reason: HOSPADM

## 2025-04-16 RX ORDER — SODIUM CHLORIDE 9 MG/ML
INJECTION, SOLUTION INTRAVENOUS PRN
Status: DISCONTINUED | OUTPATIENT
Start: 2025-04-16 | End: 2025-04-21 | Stop reason: HOSPADM

## 2025-04-16 RX ORDER — POTASSIUM CHLORIDE 1500 MG/1
40 TABLET, EXTENDED RELEASE ORAL PRN
Status: DISCONTINUED | OUTPATIENT
Start: 2025-04-16 | End: 2025-04-21 | Stop reason: HOSPADM

## 2025-04-16 RX ORDER — ONDANSETRON 2 MG/ML
4 INJECTION INTRAMUSCULAR; INTRAVENOUS ONCE
Status: COMPLETED | OUTPATIENT
Start: 2025-04-16 | End: 2025-04-16

## 2025-04-16 RX ORDER — LEVETIRACETAM 500 MG/5ML
INJECTION, SOLUTION, CONCENTRATE INTRAVENOUS
Status: DISCONTINUED
Start: 2025-04-16 | End: 2025-04-16 | Stop reason: HOSPADM

## 2025-04-16 RX ORDER — ONDANSETRON 2 MG/ML
4 INJECTION INTRAMUSCULAR; INTRAVENOUS EVERY 6 HOURS PRN
Status: DISCONTINUED | OUTPATIENT
Start: 2025-04-16 | End: 2025-04-21 | Stop reason: HOSPADM

## 2025-04-16 RX ORDER — POTASSIUM CHLORIDE 7.45 MG/ML
10 INJECTION INTRAVENOUS PRN
Status: DISCONTINUED | OUTPATIENT
Start: 2025-04-16 | End: 2025-04-21 | Stop reason: HOSPADM

## 2025-04-16 RX ORDER — MAGNESIUM SULFATE IN WATER 40 MG/ML
2000 INJECTION, SOLUTION INTRAVENOUS PRN
Status: DISCONTINUED | OUTPATIENT
Start: 2025-04-16 | End: 2025-04-21 | Stop reason: HOSPADM

## 2025-04-16 RX ADMIN — SODIUM CHLORIDE: 0.9 INJECTION, SOLUTION INTRAVENOUS at 21:50

## 2025-04-16 RX ADMIN — SODIUM CHLORIDE, PRESERVATIVE FREE 10 ML: 5 INJECTION INTRAVENOUS at 21:46

## 2025-04-16 RX ADMIN — ONDANSETRON 4 MG: 2 INJECTION, SOLUTION INTRAMUSCULAR; INTRAVENOUS at 09:55

## 2025-04-16 RX ADMIN — IOPAMIDOL 75 ML: 755 INJECTION, SOLUTION INTRAVENOUS at 10:42

## 2025-04-16 RX ADMIN — Medication 3 MG: at 22:15

## 2025-04-16 RX ADMIN — FENTANYL CITRATE 50 MCG: 50 INJECTION INTRAMUSCULAR; INTRAVENOUS at 09:55

## 2025-04-16 ASSESSMENT — PAIN - FUNCTIONAL ASSESSMENT
PAIN_FUNCTIONAL_ASSESSMENT: NONE - DENIES PAIN
PAIN_FUNCTIONAL_ASSESSMENT: NONE - DENIES PAIN

## 2025-04-16 ASSESSMENT — PAIN SCALES - PAIN ASSESSMENT IN ADVANCED DEMENTIA (PAINAD)
TOTALSCORE: 0
BODYLANGUAGE: RELAXED
BREATHING: NORMAL
CONSOLABILITY: NO NEED TO CONSOLE
TOTALSCORE: 0
FACIALEXPRESSION: SMILING OR INEXPRESSIVE
CONSOLABILITY: NO NEED TO CONSOLE
BREATHING: NORMAL
BODYLANGUAGE: RELAXED
FACIALEXPRESSION: SMILING OR INEXPRESSIVE

## 2025-04-16 ASSESSMENT — LIFESTYLE VARIABLES
HOW OFTEN DO YOU HAVE A DRINK CONTAINING ALCOHOL: PATIENT UNABLE TO ANSWER
HOW MANY STANDARD DRINKS CONTAINING ALCOHOL DO YOU HAVE ON A TYPICAL DAY: PATIENT UNABLE TO ANSWER

## 2025-04-16 NOTE — ED TRIAGE NOTES
Pt to [facility] c/o a unwitnessed fall. Per [facility] Pt was found on the floor in his room. [facility] denies the use of blood thinners and unknown  LOC. Pt has a hx of brain bleed d/t fall.

## 2025-04-16 NOTE — PROGRESS NOTES
[facility] [place] accept [place]  Transfer center PS received  Case reviewed with ER NP, [facility]  Reason for Transfer:  Angelito Hay 85 y.o. male, PMH dementia, fall in 10/2024 with SAH  - p/w (found down, unknown) fall at SNF and found to have on CT head new acute SDH and SAH.   1. New small acute subdural hematoma along the left high convexity.2. New acute left frontal and parietal subcortical intraparenchymal hematomas. ER NP d/w [facility] Neurosx NP who rec transfer to [address] for further evaluation. Neuro exam AO X1(baseline, pleasantly confused), no focal deficit, alert. Only understand simple commands.    Also has elevated trop 1479. EKG non-ischemic. CTA pulm pending.   CK 1029  - Recommendations: Admit to PCU given trop elevation, repeat 6 hr CT head. Follow-up on CK, trop trend , CTA pulm  - Consulting services needed for transfer: Neurosurgery     Prelim diagnosis: Acute SAH     Patient has been accepted for transfer to [facility].   Once patient arrive please page ON CALL HOSPITALIST so patient can be seen.   If unable to reach physician on [employer] please call hospitalist phone (#[phone])     PCP: No primary care provider on file.     Thanks  Ree Rivera MD  Hospitalist

## 2025-04-16 NOTE — ED PROVIDER NOTES
Emergency Department Attending Provider Note  Location: [facility] EMERGENCY DEPARTMENT  4/16/2025   Note Started: 9:53 AM EDT 4/16/25       Patient Identification  Angelito Hay is a 85 y.o. male      HPI:Angelito Hay was evaluated in the Emergency Department for ongoing fall at his facility.  Patient was staffed to me at approximately 9:45 AM after initial evaluation workup has begun.  He is AAO x 1 at baseline.  Is unable to give me any historical information but reportedly was complaining of some rib pain.  I was unable to elicit any tenderness on his examination.  He primarily answered I do not know to most of my questions.. Although initial history and physical exam information was obtained by MILTON/NPP/MD/ (who also dictated a record of this visit), I personally saw the patient and performed a substantive portion of the visit including all aspects of the medical decision making.      PHYSICAL EXAM:  Physical Exam  Constitutional:       General: He is not in acute distress.     Comments: Cachectic   HENT:      Head: Normocephalic and atraumatic.      Right Ear: External ear normal.      Left Ear: External ear normal.      Nose: Nose normal.      Mouth/Throat:      Mouth: Mucous membranes are moist.      Pharynx: Oropharynx is clear.   Cardiovascular:      Rate and Rhythm: Normal rate and regular rhythm.      Pulses: Normal pulses.      Heart sounds: Normal heart sounds.   Pulmonary:      Effort: Pulmonary effort is normal.      Breath sounds: Normal breath sounds.   Abdominal:      General: Abdomen is flat. There is no distension.      Palpations: Abdomen is soft.      Tenderness: There is no abdominal tenderness.   Musculoskeletal:         General: No tenderness, deformity or signs of injury. Normal range of motion.      Cervical back: Normal range of motion.   Skin:     General: Skin is warm and dry.      Capillary Refill: Capillary refill takes less than 2 seconds.   Neurological:      General: No

## 2025-04-16 NOTE — ED NOTES
Rn gave report to arelis saldana at [facility] for the continuum of care sbar discussed and all questions answered.

## 2025-04-16 NOTE — ED PROVIDER NOTES
[facility] EMERGENCY DEPARTMENT  EMERGENCY DEPARTMENT ENCOUNTER        Pt Name: Angelito Hay  MRN: [id number]  Birthdate 1940  Date of evaluation: 4/16/2025  Provider: KATIE Haddad CNP  PCP: No primary care provider on file.  Note Started: 5:13 PM EDT 4/16/25       I have seen and evaluated this patient with my supervising physician Alphonso Lui DO.      CHIEF COMPLAINT       Chief Complaint   Patient presents with    Fall     [facility]/o a unwitnessed fall. Per [facility] Pt was found on the floor in his room. [facility] denies the use of blood thinners and unknown  LOC. Pt has a hx of brain bleed d/t fall.        HISTORY OF PRESENT ILLNESS: 1 or more Elements     History From: EMS from [facility]  Limitations to history : Altered Mental Status    Angelito Hay is a 85 y.o. nontoxic, chronically unwell-appearing male with a medical history including, not limited to, intracranial hemorrhage and dementia, who presents to the Emergency Department for evaluation status post patient was found down by his nurse at 0700 hrs.  Patient had an unwitnessed fall.  It is unclear as to when the patient was last seen/last known well.  Per report patient is at his baseline mentation which is oriented to self but not to time or situation-pleasantly confused.  On my physical exam the patient has pain in his right ribs and right hip.  Head appears atraumatic.  However patient has had recent intracranial hemorrhage following a fall.  He is not anticoagulated.    Nursing Notes were all reviewed and agreed with or any disagreements were addressed in the HPI.    REVIEW OF SYSTEMS :      Review of Systems   Unable to perform ROS: Dementia   Hematological: Negative.        Positives and Pertinent negatives as per HPI.     SURGICAL HISTORY   History reviewed. No pertinent surgical history.    CURRENTMEDICATIONS       Previous Medications    LISINOPRIL (PRINIVIL;ZESTRIL) 10 MG TABLET

## 2025-04-17 ENCOUNTER — APPOINTMENT (OUTPATIENT)
Age: 85
DRG: 963 | End: 2025-04-17
Attending: INTERNAL MEDICINE
Payer: MEDICARE

## 2025-04-17 VITALS
TEMPERATURE: 97.4 F | DIASTOLIC BLOOD PRESSURE: 69 MMHG | BODY MASS INDEX: 16.45 KG/M2 | WEIGHT: 132.28 LBS | HEIGHT: 75 IN | RESPIRATION RATE: 17 BRPM | OXYGEN SATURATION: 100 % | SYSTOLIC BLOOD PRESSURE: 114 MMHG | HEART RATE: 84 BPM

## 2025-04-17 LAB
ANION GAP SERPL CALCULATED.3IONS-SCNC: 12 MMOL/L (ref 3–16)
BASOPHILS # BLD: 0 K/UL (ref 0–0.2)
BASOPHILS NFR BLD: 0.3 %
BILIRUB UR QL STRIP.AUTO: NEGATIVE
BUN SERPL-MCNC: 36 MG/DL (ref 7–20)
CALCIUM SERPL-MCNC: 8.9 MG/DL (ref 8.3–10.6)
CHLORIDE SERPL-SCNC: 104 MMOL/L (ref 99–110)
CK SERPL-CCNC: 758 U/L (ref 39–308)
CLARITY UR: CLEAR
CO2 SERPL-SCNC: 22 MMOL/L (ref 21–32)
COLOR UR: YELLOW
CREAT SERPL-MCNC: 0.7 MG/DL (ref 0.8–1.3)
DEPRECATED RDW RBC AUTO: 19.2 % (ref 12.4–15.4)
EOSINOPHIL # BLD: 0 K/UL (ref 0–0.6)
EOSINOPHIL NFR BLD: 0 %
GFR SERPLBLD CREATININE-BSD FMLA CKD-EPI: >90 ML/MIN/{1.73_M2}
GLUCOSE SERPL-MCNC: 140 MG/DL (ref 70–99)
GLUCOSE UR STRIP.AUTO-MCNC: NEGATIVE MG/DL
HCT VFR BLD AUTO: 38.4 % (ref 40.5–52.5)
HGB BLD-MCNC: 12.1 G/DL (ref 13.5–17.5)
HGB UR QL STRIP.AUTO: ABNORMAL
KETONES UR STRIP.AUTO-MCNC: ABNORMAL MG/DL
LACTATE BLDV-SCNC: 2.3 MMOL/L (ref 0.4–2)
LACTATE BLDV-SCNC: 4.2 MMOL/L (ref 0.4–2)
LEUKOCYTE ESTERASE UR QL STRIP.AUTO: NEGATIVE
LYMPHOCYTES # BLD: 0.9 K/UL (ref 1–5.1)
LYMPHOCYTES NFR BLD: 8.3 %
MCH RBC QN AUTO: 25 PG (ref 26–34)
MCHC RBC AUTO-ENTMCNC: 31.6 G/DL (ref 31–36)
MCV RBC AUTO: 79.1 FL (ref 80–100)
MONOCYTES # BLD: 1 K/UL (ref 0–1.3)
MONOCYTES NFR BLD: 8.5 %
NEUTROPHILS # BLD: 9.4 K/UL (ref 1.7–7.7)
NEUTROPHILS NFR BLD: 82.9 %
NITRITE UR QL STRIP.AUTO: NEGATIVE
PH UR STRIP.AUTO: 6 [PH] (ref 5–8)
PLATELET # BLD AUTO: 180 K/UL (ref 135–450)
PMV BLD AUTO: 8.3 FL (ref 5–10.5)
POTASSIUM SERPL-SCNC: 5.1 MMOL/L (ref 3.5–5.1)
PROT UR STRIP.AUTO-MCNC: 30 MG/DL
RBC # BLD AUTO: 4.85 M/UL (ref 4.2–5.9)
RBC #/AREA URNS HPF: ABNORMAL /HPF (ref 0–4)
SODIUM SERPL-SCNC: 138 MMOL/L (ref 136–145)
SP GR UR STRIP.AUTO: >=1.03 (ref 1–1.03)
TROPONIN, HIGH SENSITIVITY: 1286 NG/L (ref 0–22)
UA COMPLETE W REFLEX CULTURE PNL UR: ABNORMAL
UA DIPSTICK W REFLEX MICRO PNL UR: YES
URN SPEC COLLECT METH UR: ABNORMAL
UROBILINOGEN UR STRIP-ACNC: 0.2 E.U./DL
WBC # BLD AUTO: 11.3 K/UL (ref 4–11)
WBC #/AREA URNS HPF: ABNORMAL /HPF (ref 0–5)

## 2025-04-17 PROCEDURE — 51798 US URINE CAPACITY MEASURE: CPT

## 2025-04-17 PROCEDURE — 6370000000 HC RX 637 (ALT 250 FOR IP): Performed by: INTERNAL MEDICINE

## 2025-04-17 PROCEDURE — 2700000000 HC OXYGEN THERAPY PER DAY

## 2025-04-17 PROCEDURE — 51701 INSERT BLADDER CATHETER: CPT

## 2025-04-17 PROCEDURE — 2060000000 HC ICU INTERMEDIATE R&B

## 2025-04-17 PROCEDURE — 2580000003 HC RX 258: Performed by: INTERNAL MEDICINE

## 2025-04-17 PROCEDURE — 6360000002 HC RX W HCPCS: Performed by: INTERNAL MEDICINE

## 2025-04-17 PROCEDURE — 87040 BLOOD CULTURE FOR BACTERIA: CPT

## 2025-04-17 PROCEDURE — 94761 N-INVAS EAR/PLS OXIMETRY MLT: CPT

## 2025-04-17 PROCEDURE — APPNB180 APP NON BILLABLE TIME > 60 MINS: Performed by: NURSE PRACTITIONER

## 2025-04-17 PROCEDURE — 6360000002 HC RX W HCPCS: Performed by: NURSE PRACTITIONER

## 2025-04-17 PROCEDURE — 83605 ASSAY OF LACTIC ACID: CPT

## 2025-04-17 PROCEDURE — 85025 COMPLETE CBC W/AUTO DIFF WBC: CPT

## 2025-04-17 PROCEDURE — 81001 URINALYSIS AUTO W/SCOPE: CPT

## 2025-04-17 PROCEDURE — 80048 BASIC METABOLIC PNL TOTAL CA: CPT

## 2025-04-17 PROCEDURE — 36415 COLL VENOUS BLD VENIPUNCTURE: CPT

## 2025-04-17 PROCEDURE — 99223 1ST HOSP IP/OBS HIGH 75: CPT | Performed by: INTERNAL MEDICINE

## 2025-04-17 RX ORDER — MORPHINE SULFATE 2 MG/ML
2 INJECTION, SOLUTION INTRAMUSCULAR; INTRAVENOUS
Status: COMPLETED | OUTPATIENT
Start: 2025-04-17 | End: 2025-04-17

## 2025-04-17 RX ADMIN — SODIUM CHLORIDE 1500 MG: 0.9 INJECTION, SOLUTION INTRAVENOUS at 08:41

## 2025-04-17 RX ADMIN — LISINOPRIL 10 MG: 10 TABLET ORAL at 12:34

## 2025-04-17 RX ADMIN — MORPHINE SULFATE 2 MG: 2 INJECTION, SOLUTION INTRAMUSCULAR; INTRAVENOUS at 22:38

## 2025-04-17 RX ADMIN — CEFEPIME 2000 MG: 2 INJECTION, POWDER, FOR SOLUTION INTRAVENOUS at 06:50

## 2025-04-17 RX ADMIN — CEFEPIME 2000 MG: 2 INJECTION, POWDER, FOR SOLUTION INTRAVENOUS at 17:10

## 2025-04-17 RX ADMIN — CEFEPIME 2000 MG: 2 INJECTION, POWDER, FOR SOLUTION INTRAVENOUS at 22:11

## 2025-04-17 ASSESSMENT — PAIN SCALES - PAIN ASSESSMENT IN ADVANCED DEMENTIA (PAINAD)
TOTALSCORE: 4
BODYLANGUAGE: RELAXED
TOTALSCORE: 0
BODYLANGUAGE: RELAXED
FACIALEXPRESSION: SMILING OR INEXPRESSIVE
CONSOLABILITY: NO NEED TO CONSOLE
BREATHING: NORMAL
CONSOLABILITY: DISTRACTED OR REASSURED BY VOICE/TOUCH
FACIALEXPRESSION: SAD, FRIGHTENED, FROWN
BODYLANGUAGE: TENSE, DISTRESSED PACING, FIDGETING
TOTALSCORE: 1
FACIALEXPRESSION: SAD, FRIGHTENED, FROWN
NEGVOCALIZATION: OCCASIONAL MOAN/GROAN, LOW SPEECH, NEGATIVE/DISAPPROVING QUALITY
CONSOLABILITY: NO NEED TO CONSOLE
BREATHING: NORMAL
BREATHING: NORMAL

## 2025-04-17 NOTE — CONSULTS
NEUROSURGERY CONSULT NOTE    ASPEN MARTINEZ  [id number]   1940 4/17/2025    Requesting physician: Ree FERRER MD    Reason for consultation: SDH    History of present illness: Patient cannot provide adequate history due to underlying dementia. so info taken from chart. He is a 85 y.o. male with PMHx significant for dementia, hypertension who presented to ED after he had an unwitnessed fall at the nursing facility.  Patient was found down on the ground.  Patient was brought to ED for further evaluation.  CT of the head revealed tiny acute subdural hematoma with left frontal intraparenchymal hematoma.  Repeat scan showed no extra axial fluid collection but some asymmetry in left frontal area. No IPH.       ROS: ASHIA  No Known Allergies    Past Medical History:   Diagnosis Date    Smoking 08/27/2024    POA provided information        No past surgical history on file.    Social History     Occupational History    Not on file   Tobacco Use    Smoking status: Former     Types: Cigarettes    Smokeless tobacco: Never   Substance and Sexual Activity    Alcohol use: Not on file    Drug use: Not on file    Sexual activity: Not on file        No family history on file.     No outpatient medications have been marked as taking for the 4/16/25 encounter (Hospital Encounter).        Current Facility-Administered Medications   Medication Dose Route Frequency Provider Last Rate Last Admin    ceFEPIme (MAXIPIME) 2,000 mg in sodium chloride 0.9 % 100 mL IVPB (addEASE)  2,000 mg IntraVENous Q12H Jenelle Camilo MD        vancomycin (VANCOCIN) 1,500 mg in sodium chloride 0.9 % 250 mL IVPB ([id number])  1,500 mg IntraVENous Once Jenelle Camilo .7 mL/hr at 04/17/25 0841 1,500 mg at 04/17/25 0841    [START ON 4/18/2025] vancomycin (VANCOCIN) 1,500 mg in sodium chloride 0.9 % 250 mL IVPB ([id number])  1,500 mg IntraVENous Q24H Jenelle Camilo MD        lisinopril (PRINIVIL;ZESTRIL) tablet 10 mg  10 mg Oral

## 2025-04-17 NOTE — CONSULTS
NEUROSURGERY CONSULT NOTE    ASPEN MARTINEZ  [id number]   1940 4/17/2025    Requesting physician: Ree FERRER MD    Reason for consultation: SDH    History of present illness: Patient cannot provide adequate history due to underlying dementia. so info taken from chart. He is a 85 y.o. male with PMHx significant for dementia, hypertension who presented to ED after he had an unwitnessed fall at the nursing facility.  Patient was found down on the ground.  Patient was brought to ED for further evaluation.  CT of the head revealed tiny acute subdural hematoma with left frontal intraparenchymal hematoma.  Repeat scan showed no extra axial fluid collection but some asymmetry in left frontal area. No IPH.       ROS: ASHIA  No Known Allergies    Past Medical History:   Diagnosis Date    Smoking 08/27/2024    POA provided information        No past surgical history on file.    Social History     Occupational History    Not on file   Tobacco Use    Smoking status: Former     Types: Cigarettes    Smokeless tobacco: Never   Substance and Sexual Activity    Alcohol use: Not on file    Drug use: Not on file    Sexual activity: Not on file        No family history on file.     Outpatient Medications Marked as Taking for the 4/16/25 encounter (Hospital Encounter)   Medication Sig Dispense Refill    vitamin D (CHOLECALCIFEROL) 50 MCG (2000 UT) CAPS capsule Take 1 capsule by mouth daily      lisinopril (PRINIVIL;ZESTRIL) 10 MG tablet Take 1 tablet by mouth daily 30 tablet 2        Current Facility-Administered Medications   Medication Dose Route Frequency Provider Last Rate Last Admin    [START ON 4/18/2025] vancomycin (VANCOCIN) 750 mg in sodium chloride 0.9 % 250 mL IVPB (Yrko4Syx)  750 mg IntraVENous Q12H Jenelle Camilo MD        ceFEPIme (MAXIPIME) 2,000 mg in sodium chloride 0.9 % 100 mL IVPB (addEASE)  2,000 mg IntraVENous Q8H Jenelle Camilo MD 25 mL/hr at 04/17/25 1710 2,000 mg at 04/17/25 1710

## 2025-04-17 NOTE — PROGRESS NOTES
Hospital Medicine Progress Note      Date of Admission: 4/16/2025  Hospital Day: 2    Chief Admission Complaint: Fall     Subjective: Patient seen and examined at bedside  Points out to his lower abdomen  Bladder scan more than 400 mL  Able to follow simple commands and mentation at baseline  Concerned about p.o. intake    Presenting Admission History:       85 y.o. male with PMHx significant for dementia, hypertension who presented to ED after he had an unwitnessed fall at the nursing facility..  Patient was found down on the ground.  Patient was brought to ED for further evaluation.  CT of the head revealed acute subdural hematoma with left frontal intraparenchymal hematoma.  Patient has history of subarachnoid hemorrhage in 10/2024.  Patient cannot provide adequate history due to underlying dementia.  In ED labs were remarkable for troponin 1479 which is currently trending down, CK 1029, neurosurgery was consulted.  Patient currently being admitted to 5 tower stepdown unit for further management and evaluation.     Assessment/Plan:      Acute subdural hematoma  Acute right frontal and parietal intraparenchymal hematoma  History of subarachnoid hemorrhage 10/2024  Follow-up repeat CT, stable hemorrhage  Neurosurgery consulted, no further interventions unless there is decline in neurological status.  Hold full dose anticoagulation and antiplatelet therapy for 2 weeks.  PT and OT    S/p unwitnessed fall at nursing facility  NSTEMI  Troponinemia  Troponin peaked at 1400  Cardiology consulted, not a candidate for invasive cardiac diagnostic or therapeutic procedure at this time  Follow echo    Rhabdomyolysis, traumatic due to prolonged downtime  CK 1000 on presentation, downtrending  Continue IV fluids      Lactic acidosis  Concerns of sepsis  Lactate 4.2 on presentation, down trended  Will follow blood culture  UA negative for infection  Was started on Vanco and cefepime for concerns of infection, will continue to

## 2025-04-17 NOTE — CARE COORDINATION
Case Management Assessment  Initial Evaluation    Date/Time of Evaluation: 4/17/2025 4:47 PM  Assessment Completed by: SHANELLE Lovelace    If patient is discharged prior to next notation, then this note serves as note for discharge by case management.    Patient Name: Angelito Hay                   YOB: 1940  Diagnosis: Subarachnoid hemorrhage (HCC) [I60.9]                   Date / Time: 4/16/2025  7:51 PM    Patient Admission Status: Inpatient   Readmission Risk (Low < 19, Mod (19-27), High > 27): Readmission Risk Score: 14.4    Current PCP: No primary care provider on file.  PCP verified by CM? Yes    Chart Reviewed: Yes      History Provided by: Medical Record  Patient Orientation: Person    Patient Cognition: Alert    Hospitalization in the last 30 days (Readmission):  No    If yes, Readmission Assessment in CM Navigator will be completed.    Advance Directives:      Code Status: Full Code   Patient's Primary Decision Maker is: Named in Scanned ACP Document    Secondary Decision Maker: Fabian Washington - Other [facility] - [phone]    Discharge Planning:    Patient lives with: Other (Comment) ([facility]) Type of Home: Assisted living (staff at [facility])  Primary Care Giver: Other (Comment) (staff at [facility])  Patient Support Systems include: Family Members, Friends/Neighbors   Current Financial resources: Medicare, Other (Comment) ([facility])  Current community resources: Assisted Living (staff at [facility])  Current services prior to admission: Other (Comment) ([facility] - [facility])            Current DME:              Type of Home Care services:  None    ADLS  Prior functional level: Other (see comment) (unknown)  Current functional level: Other (see comment) (PT/OT evals pending)    PT AM-PAC:   /24  OT AM-PAC:   /24    Family can provide assistance at DC: No  Would you like Case Management to discuss the discharge plan  with any other family members/significant others, and if so, who? Yes  Plans to Return to Present Housing: Unknown at present  Other Identified Issues/Barriers to RETURNING to current housing: tbd  Potential Assistance needed at discharge: Other (Comment) (tbd)            Potential DME:    Patient expects to discharge to: Unknown  Plan for transportation at discharge: Other (see comment) (tbd)    Financial    Payor: MEDICARE / Plan: MEDICARE PART A AND B / Product Type: *No Product type* /     Does insurance require precert for SNF: No    Potential assistance Purchasing Medications: No  Meds-to-Beds request:      No Pharmacies Listed    Notes:    Factors facilitating achievement of predicted outcomes: Family support and Cooperative    Barriers to discharge: PT/OT evals pending    Additional Case Management Notes: CM conducted chart review today. Pt is from [facility] at [facility] and has an involved Nephew, Konrad. PT/OT evals are currently pending and CM will f/u with family tomorrow to discuss discharge plans/needs.    The Plan for Transition of Care is related to the following treatment goals of Subarachnoid hemorrhage (HCC) [I60.9]    IF APPLICABLE: The Patient and/or patient representative Angelito and his family were provided with a choice of provider and agrees with the discharge plan. Freedom of choice list with basic dialogue that supports the patient's individualized plan of care/goals and shares the quality data associated with the providers was provided to:     Patient Representative Name:       The Patient and/or Patient Representative Agree with the Discharge Plan?      SHANELLE Lovelace  Case Management Department  Ph: [phone] Fax: [phone]

## 2025-04-17 NOTE — H&P
Critical lab back from [facility] Luis of Lactic Acid 4.2 from 23:35 draw.    Pt recently arrived from [facility] with SDH and SAH, has been in CT this evening for    CT head without contrast, then Blood obtained. He has been afebrile up to this time with    Vitals of 125/83, HR 68, RR 16 and Teamp 97.6. No fevers recorded today.       Assessment: Critical lab results or Lactic 4.2 and Troponin 1.286 up from 1, 203 at 1100                           Yesterday at [facility].     Plan:    Blood Cultures STAT x 2  Dr. Camilo made aware of critical lab findings.  Given that Pt. Is afebrile, VSS, unclear if Sepsis bolus desired at this time. Dr. Camilo             answered quickly and will make that determination.

## 2025-04-17 NOTE — H&P
Hospital Medicine History & Physical      Date of Admission: 4/16/2025    Date of Service:  Pt seen/examined on 04/16/25     [x]Admitted to Inpatient with expected LOS greater than two midnights due to medical therapy.  []Placed in Observation status.    Chief Admission Complaint: Unwitnessed fall    Presenting Admission History:      85 y.o. male with PMHx significant for dementia, hypertension who presented to ED after he had an unwitnessed fall at the nursing facility..  Patient was found down on the ground.  Patient was brought to ED for further evaluation.  CT of the head revealed acute subdural hematoma with left frontal intraparenchymal hematoma.  Patient has history of subarachnoid hemorrhage in 10/2024.  Patient cannot provide adequate history due to underlying dementia.  In ED labs were remarkable for troponin 1479 which is currently trending down, CK 1029, neurosurgery was consulted.  Patient currently being admitted to 5 tower stepdown unit for further management and evaluation.    ROS:     Patient cannot provide ROS due to underlying dementia    Assessment:  Acute subdural hematoma.  Acute right frontal/parietal intraparenchymal hematoma.  Status post unwitnessed fall at nursing facility.  History of subarachnoid hemorrhage 10/2024.  NSTEMI, troponin trending down.  Rhabdomyolysis, traumatic.  Elevated lactic acid  Dementia without behavioral disturbance.  Essential hypertension    Plan:    Patient admitted under inpatient status.  Neurochecks every 4 hours.  Repeat CT head was obtained and showed stability of the intracranial bleed  Avoid any antiplatelets or anticoagulation.  Neurosurgery was consulted.  Obtain echocardiogram in view of elevated troponin.  Cardiology consultation.  Trend CK level and monitor renal function  Start IV fluids with normal saline.  Trend lactic acid level, obtain blood cultures, UA and urine culture  Empirically start IV vancomycin and cefepime  Resume the rest of home  XRAY VIEWS OF THE RIGHT RIBS WITH FRONTAL XRAY VIEW OF THE CHEST 4/16/2025 9:24 am COMPARISON: None available. HISTORY: ORDERING SYSTEM PROVIDED HISTORY: unwitnessed fall TECHNOLOGIST PROVIDED HISTORY: Reason for exam:->unwitnessed fall Reason for Exam: fall/pain on right hip FINDINGS: There is biapical scarring.  The lungs are hyperexpanded.  The bilateral main pulmonary arteries are prominent.  The cardiac silhouette is within normal limits.  There is no pneumothorax or pleural effusion.  There are age-indeterminate right lateral 9 and 10 rib fractures.     1. Age-indeterminate right 9 and 10 rib fractures, likely old; correlate with point tenderness.     XR HIP RIGHT MIN 4VW W PELVIS  Result Date: 4/16/2025  EXAMINATION: ONE XRAY VIEW OF THE PELVIS AND TWO XRAY VIEWS RIGHT HIP 4/16/2025 9:24 am COMPARISON: None. HISTORY: ORDERING SYSTEM PROVIDED HISTORY: fall/pain on right hip TECHNOLOGIST PROVIDED HISTORY: Reason for exam:->fall/pain on right hip Reason for Exam: fall/pain on right hip FINDINGS: No acute fracture or dislocation.  The hip joint spaces are grossly preserved.  No osteonecrosis.     No acute findings or significant degenerative change       PCP: No primary care provider on file.    Past Medical History:        Diagnosis Date    Smoking 08/27/2024    POA provided information       Past Surgical History:    No past surgical history on file.    Medications Prior to Admission:   Prior to Admission medications    Medication Sig Start Date End Date Taking? Authorizing Provider   lisinopril (PRINIVIL;ZESTRIL) 10 MG tablet Take 1 tablet by mouth daily 9/19/24   Christel Johnson MD       Labs: Personally reviewed and interpreted for clinical significance.   Recent Labs     04/16/25  0923   WBC 12.6*   HGB 11.7*   HCT 36.7*        Recent Labs     04/16/25  0923      K 4.9      CO2 24   BUN 28*   CREATININE 0.8   CALCIUM 9.8     Recent Labs     04/16/25  0923 04/16/25  1101   TROPHS

## 2025-04-17 NOTE — PROGRESS NOTES
4 Eyes Skin Assessment     NAME:  Angelito Hay  YOB: 1940  MEDICAL RECORD NUMBER:  [id number]    The patient is being assessed for  Admission    I agree that at least one RN has performed a thorough Head to Toe Skin Assessment on the patient. ALL assessment sites listed below have been assessed.      Areas assessed by both nurses:    Head, Face, Ears, Shoulders, Back, Chest, Arms, Elbows, Hands, Sacrum. Buttock, Coccyx, Ischium, and Legs. Feet and Heels        Does the Patient have a Wound? Yes wound(s) were present on assessment. LDA wound assessment was Initiated and completed by RN     Stage 1 pressure on sacrum, nonblanchable redness  Excoriation, redness, scaling on bilateral legs, some open parts  Redness scattered on back and buttocks blanchable  Echymosis bilateral upper extremities  Eric Prevention initiated by RN: Yes  Wound Care Orders initiated by RN: No    Pressure Injury (Stage 3,4, Unstageable, DTI, NWPT, and Complex wounds) if present, place Wound referral order by RN under : No    New Ostomies, if present place, Ostomy referral order under : No     Nurse 1 eSignature: Electronically signed by Julita Anderson RN on 4/16/25 at 10:02 PM EDT    **SHARE this note so that the co-signing nurse can place an eSignature**    Nurse 2 eSignature: Electronically signed by Rubi Luis RN on 4/16/25 at 10:38 PM EDT

## 2025-04-17 NOTE — PROGRESS NOTES
Pt Aox1 to self occasionally. Pt O2 dropped slightly to 85% while sleeping, pt placed on 1L NC for comfort. Pt ambulating X1 SBA wth GB, voiding BRP, no BM this shift. Pt tolerating PO meds and fluids. NS running 100ml/hr. Pt educated on safety measures, bed alarm in locked, lowest position and alarm on,  sock applied, table and call light in reach. Pt showing no signs of distress at this time.

## 2025-04-17 NOTE — PLAN OF CARE
Problem: Safety - Adult  Goal: Free from fall injury  Outcome: Progressing   All fall precautions in place. Bed locked and in lowest position with alarm on. Overbed table and personal belonings within reach. Call light within reach and patient instructed to use call light for assistance. Non-skid socks on.  Problem: Pain  Goal: Verbalizes/displays adequate comfort level or baseline comfort level  Outcome: Progressing   Pt endorsing pain to ribs. Being treated with PRN pain medication, rest, and frequent repositioning with pillow support for comfort and pressure relief. Pt reports some relief from pain with above interventions.

## 2025-04-17 NOTE — PLAN OF CARE
Problem: Pain  Goal: Verbalizes/displays adequate comfort level or baseline comfort level  4/17/2025 0737 by Faisal Stephenson, RN  Outcome: Progressing  Note: No signs or symptoms of pain noted this AM.    Problem: ABCDS Injury Assessment  Goal: Absence of physical injury  4/17/2025 0737 by Faisal Stephenson, RN  Outcome: Progressing  Note: Patient has remained free of physical injury this shift. Fall and safety precautions in place. Bed exit alarm activated, bed in lowest position with wheels locked, call light and belongings within reach.

## 2025-04-17 NOTE — CONSULTS
[facility] -  Clinical Pharmacy Note    Vancomycin - Management by Pharmacy    Consult Date(s): 04/17/25  Consulting Provider(s): Dr. Camilo    Assessment / Plan  Sepsis of Unknown Etiology - Vancomycin  Concurrent Antimicrobials:   Cefepime  (will increase to 2g IV q8h today as CrCl now > 60ml/min)  Day of Vanc Therapy / Ordered Duration: Day 1 of 7  Current Dosing Method: Bayesian-Guided AUC Dosing  Therapeutic Goal: -600 mg/L*hr  Current Dose / Plan:   SCr 0.8? 0.7 today (baseline ~ 0.6)  1500 mg IV load given this am  Will start 750 mg IV q12h  Estimated AUCss ~ 474 mg/L*hr and troughss ~ 13.3 mg/L on this regimen  Level ordered for tomorrow am to confirm kinetic estimates  Will continue to monitor clinical condition and make adjustments to regimen as appropriate.    Thank you for consulting pharmacy,    Please call with any questions    Nicolle Lagunas  Pharm.D. [facility]  [phone] (Main Pharmacy)           Interval update:   Pt is afebrile and HDS. On 1L O2.  LA and WBC trending down.     Subjective/Objective:   Angelito Hay is a 85 y.o. male with a PMHx significant for dementia, HTN, recent SAH 10/2024.  Presented 4/16 after unwitnessed fall at NH- admitted with SDH and intraparenchymal hematomas, NSTEMI, rhabdomyolysis and sepsis. Started on broad spectrum antibiotics.    Pharmacy is consulted to dose vancomcyin.    Ht Readings from Last 1 Encounters:   04/16/25 1.905 m (6' 3\")     Wt Readings from Last 1 Encounters:   04/16/25 60.3 kg (133 lb)     Current & Prior Antimicrobial Regimen(s):  Cefepime - (4/17-current)  Vancomycin - Pharmacy to dose  1500 mg IV x1 (4/17)  750 mg IV q12h (4/17-current)    Vancomycin Level(s) / Doses:    Date Time Dose Type of Level / Level Interpretation   4/18 ~0600 750 mg IV q12h Random=            Note: Serum levels collected for AUC-based dosing may be high if collected in close proximity to the dose administered. This is not necessarily indicative of

## 2025-04-17 NOTE — PROGRESS NOTES
Patient admitted to room 5513. Report received from KAYLEIGH Rodriguez at bedside. VSS on room air. Patient is a/o x1. Patient oriented to room and call light. Rights and responsibilities provided to patient, and welcome packet provided to patient. 4 eyes skin assessment completed with 2nd RN.

## 2025-04-17 NOTE — CONSULTS
Clinical Pharmacy Consult Note  Medication History     Admit Date: 4/16/2025    Pharmacy consulted to verify home medication list by Dr. Rivera.    List of current medications patient is taking is complete. Home Medication list in EPIC updated to reflect changes noted below.    Source of Information:   Review of Rx dispense history (Surescripts-complete dispense report in Epic)  Rx fills (7 days each) to NH per [facility] available on dispense report    Review of facility papers (list in chart from [facility])    Patient's home pharmacy:  [facility] - [place], [place] - [address] [phone] - F [phone]     CHANGES TO HOME MEDICATION LIST:    REMOVED:    none  ADDED   1) Vitamin D 2000 units daily    DOSE or FREQUENCY CHANGE     none        Current Outpatient Medications   Medication Instructions    lisinopril (PRINIVIL;ZESTRIL) 10 mg, Oral, DAILY    vitamin D (CHOLECALCIFEROL) 2,000 Units, DAILY       Thank you for consulting pharmacy  Please call with any questions    Rupal Lagunas RPh PharmD, BCPS   Inpatient pharmacy [phone]

## 2025-04-17 NOTE — PROGRESS NOTES
Comprehensive Nutrition Assessment    RECOMMENDATIONS:  PO Diet: Continue Regular  Nutrition Supplement: Will order Ensure+ HP BID  Nutrition Education: Education/Counseling not appropriate     NUTRITION ASSESSMENT:   Nutritional summary & status: Assessed for low BMI. Presented to [facility] ED from nursing facility after fall; CT head revealed acute subdural hematoma w/ left frontal intraparenchymal hematoma per H&P. Pt also has hx of subarachnoid hemorrhage 10/2024 per H&P. Per wt hx in EMR, +wt gain (10-12 lb) over past 7 months. BMI 16.6 so still underwt. Pt has hx of dementia and only oriented to self at baseline. RN conducting pt care at time of attempted visit; will defer NFPE to f/u as able. Documented PO intake per EMR only shows 2 meals so far; both at 1-25% consumed. Will order Ensure+ HP BID to supplement meal intake while poor and continue to monitor.   Admission // PMH: Subarachnoid hemorrhage // HTN, dementia    MALNUTRITION ASSESSMENT  Context of Malnutrition: Chronic Illness   Malnutrition Status: Insufficient data  Findings of the 6 clinical characteristics of malnutrition (Minimum of 2 out of 6 clinical characteristics is required to make the diagnosis of moderate or severe Protein Calorie Malnutrition based on AND/ASPEN Guidelines):  Energy Intake:  Unable to assess  Weight Loss:  No weight loss     Body Fat Loss:  Unable to assess     Muscle Mass Loss:  Unable to assess      NUTRITION DIAGNOSIS   Underweight related to inadequate protein-energy intake as evidenced by BMI    Nutrition Monitoring and Evaluation:   Food/Nutrient Intake Outcomes:  Food and Nutrient Intake, Supplement Intake  Physical Signs/Symptoms Outcomes:  Biochemical Data, Nutrition Focused Physical Findings, Skin, Weight     OBJECTIVE DATA: Significant to nutrition assessment  Nutrition Related Findings: LBM pta; glycolax PRN. Labs reviewed; tentes WNL. Gluc 140-153.  Wounds: Pressure Injury  Nutrition Goals: PO intake 50% or

## 2025-04-17 NOTE — CONSULTS
CARDIOLOGY CONSULTATION        Patient Name: Angelito Hay  Date of admission: 4/16/2025  7:51 PM  Admission Dx: Subarachnoid hemorrhage (HCC) [I60.9]  Requesting Physician: Ree FERRER MD  Primary Care physician: No primary care provider on file.    Reason for Consultation/Chief Complaint: elevated troponin, fall     History of Present Illness:     Angelito Hay is a 85 y.o. patient who presents after a fall at his nursing home. He was transferred to [facility] from [facility] for neurosurgery consult for SAH. Troponins are also found to be elevated and cardiology has been consulted.     Patient was found down at his nursing facility after unwitnessed fall. He is on a memory care unit and has a history of dementia. Pmh significant for Htn, AAA (6.4) and has been evaluated by Dr Wheeler with no plan for repair given the high risk and complexity.      Patient reports no pain anywhere but is short of breath. He does not open his eyes or speak much, only answered a couple of questions. Does not appear to be in distress.       Past Medical History:   has a past medical history of Smoking.    Surgical History:   has no past surgical history on file.     Social History:   reports that he has quit smoking. His smoking use included cigarettes. He has never used smokeless tobacco.     Family History:  family history is not on file.      Home Medications:  Were reviewed and are listed in nursing record and/or below  Prior to Admission medications    Medication Sig Start Date End Date Taking? Authorizing Provider   lisinopril (PRINIVIL;ZESTRIL) 10 MG tablet Take 1 tablet by mouth daily 9/19/24   Christel Johnson MD        CURRENT Medications:  ceFEPIme (MAXIPIME) 2,000 mg in sodium chloride 0.9 % 100 mL IVPB (addEASE), Q12H  vancomycin (VANCOCIN) 1,500 mg in sodium chloride 0.9 % 250 mL IVPB ([id number]), Once  [START ON 4/18/2025] vancomycin (VANCOCIN) 1,500 mg in sodium chloride 0.9 % 250 mL IVPB  (Mema7Jpt), Q24H  lisinopril (PRINIVIL;ZESTRIL) tablet 10 mg, Daily  sodium chloride flush 0.9 % injection 5-40 mL, 2 times per day  sodium chloride flush 0.9 % injection 5-40 mL, PRN  0.9 % sodium chloride infusion, PRN  potassium chloride (KLOR-CON M) extended release tablet 40 mEq, PRN   Or  potassium bicarb-citric acid (EFFER-K) effervescent tablet 40 mEq, PRN   Or  potassium chloride 10 mEq/100 mL IVPB (Peripheral Line), PRN  magnesium sulfate 2000 mg in 50 mL IVPB premix, PRN  ondansetron (ZOFRAN-ODT) disintegrating tablet 4 mg, Q8H PRN   Or  ondansetron (ZOFRAN) injection 4 mg, Q6H PRN  polyethylene glycol (GLYCOLAX) packet 17 g, Daily PRN  acetaminophen (TYLENOL) tablet 650 mg, Q6H PRN   Or  acetaminophen (TYLENOL) suppository 650 mg, Q6H PRN  0.9 % sodium chloride infusion, Continuous  melatonin tablet 3 mg, Nightly PRN  LORazepam (ATIVAN) injection 0.5 mg, Once PRN        Allergies:  Patient has no known allergies.     Review of Systems:   A 14 point review of symptoms completed. Pertinent positives identified in the HPI, all other review of symptoms negative as below.      Objective:     Vitals:    04/17/25 0830   BP: 104/66   Pulse: 72   Resp: 13   Temp: 97.8 °F (36.6 °C)   SpO2: 92%    Weight - Scale: 60.3 kg (133 lb)       PHYSICAL EXAM:      General:  Lethargic, no distress, appears stated age   Head:  Normocephalic, atraumatic   Eyes:  Conjunctiva/corneas clear, anicteric sclerae    Nose: Nares normal, no drainage    Throat: No abnormalities of the lips, oral mucosa or tongue.    Neck: Trachea midline. Neck supple with no lymphadenopathy, no tenderness/mass/nodules, no Jugular venous pressure elevation    Lungs:   Decreased breath sounds bilaterally, no wheezes, no rales, no respiratory distress   Chest Wall:  No deformity or tenderness to palpation   Heart:  Regular rate and rhythm, normal S1, normal S2, no appreciable murmur, no rub   Abdomen:   Soft, non-tender, with normoactive bowel sounds. No

## 2025-04-17 NOTE — CONSULTS
[facility] -  Clinical Pharmacy Note    Vancomycin - Management by Pharmacy    Consult Date(s): 04/17/25  Consulting Provider(s): Dr. Camilo    Assessment / Plan  Sepsis of Unknown Etiology - Vancomycin  Concurrent Antimicrobials:   Cefepime  Day of Vanc Therapy / Ordered Duration: Day 1 of 7  Current Dosing Method: Bayesian-Guided AUC Dosing  Therapeutic Goal: -600 mg/L*hr  Current Dose / Plan:   Patient RF stable (sCr 0.8), elevated lactic acid, sepsis (3.3), elevated CRP (38.7), leukocytosis (WBC 12.6)  Vancomycin 1500mg IV x 1 load dose followed by 1500mg IV Q24H  Estimated ISY54xho 441 mg/L*hr, AUCsteady state 487 mg/L*hr, & Ctrough 12.9 mg/L  Will continue to monitor clinical condition and make adjustments to regimen as appropriate.    Thank you for consulting pharmacy,    Jordon Walsh, [facility] ext [phone]        Interval update:  New start    Subjective/Objective:   Angelito Hay is a 85 y.o. male with a PMHx significant for intracranial hemorrhage and dementia who is admitted with altered mental status.     Pharmacy is consulted to dose vancomcyin.    Ht Readings from Last 1 Encounters:   04/16/25 1.905 m (6' 3\")     Wt Readings from Last 1 Encounters:   04/16/25 60.3 kg (133 lb)     Current & Prior Antimicrobial Regimen(s):  Cefepime - current  Vancomycin - current    Vancomycin Level(s) / Doses:    Date Time Dose Type of Level / Level Interpretation                 Note: Serum levels collected for AUC-based dosing may be high if collected in close proximity to the dose administered. This is not necessarily indicative of toxicity.    Cultures & Sensitivities:    Date Site Micro Susceptibility / Result                 Recent Labs     04/16/25  0923   CREATININE 0.8   BUN 28*   WBC 12.6*       Estimated Creatinine Clearance: 60 mL/min (based on SCr of 0.8 mg/dL).    Additional Lab Values / Findings of Note:    Recent Labs     04/16/25  0923   PROCAL 0.40*

## 2025-04-17 NOTE — PROGRESS NOTES
Occupational / Physical Therapy  Hold   Attempt to see this AM unable to wake. Pt groaning at times but sleeping soundly. Unable to participate at this time. Will follow up later this date vs 4/18.     Madhuri Canseco, MOT, OTR/L, CNS  Louise Gerard, PT [phone]

## 2025-04-18 ENCOUNTER — APPOINTMENT (OUTPATIENT)
Age: 85
DRG: 963 | End: 2025-04-18
Attending: INTERNAL MEDICINE
Payer: MEDICARE

## 2025-04-18 ENCOUNTER — APPOINTMENT (OUTPATIENT)
Dept: CT IMAGING | Age: 85
DRG: 963 | End: 2025-04-18
Payer: MEDICARE

## 2025-04-18 LAB
ANION GAP SERPL CALCULATED.3IONS-SCNC: 12 MMOL/L (ref 3–16)
BASOPHILS # BLD: 0 K/UL (ref 0–0.2)
BASOPHILS NFR BLD: 0.2 %
BUN SERPL-MCNC: 39 MG/DL (ref 7–20)
CALCIUM SERPL-MCNC: 9 MG/DL (ref 8.3–10.6)
CHLORIDE SERPL-SCNC: 106 MMOL/L (ref 99–110)
CK SERPL-CCNC: 218 U/L (ref 39–308)
CO2 SERPL-SCNC: 20 MMOL/L (ref 21–32)
CREAT SERPL-MCNC: 0.7 MG/DL (ref 0.8–1.3)
DEPRECATED RDW RBC AUTO: 19.3 % (ref 12.4–15.4)
ECHO AO ROOT DIAM: 3.2 CM
ECHO AO ROOT INDEX: 1.73 CM/M2
ECHO AV AREA PEAK VELOCITY: 1.4 CM2
ECHO AV AREA VTI: 1.3 CM2
ECHO AV AREA/BSA PEAK VELOCITY: 0.8 CM2/M2
ECHO AV AREA/BSA VTI: 0.7 CM2/M2
ECHO AV MEAN GRADIENT: 3 MMHG
ECHO AV MEAN VELOCITY: 0.7 M/S
ECHO AV PEAK GRADIENT: 5 MMHG
ECHO AV PEAK VELOCITY: 1.1 M/S
ECHO AV VELOCITY RATIO: 0.45
ECHO AV VTI: 17.8 CM
ECHO BSA: 1.79 M2
ECHO EST RA PRESSURE: 15 MMHG
ECHO IVC PROX: 2.5 CM
ECHO LA AREA 4C: 23.3 CM2
ECHO LA DIAMETER INDEX: 2.05 CM/M2
ECHO LA DIAMETER: 3.8 CM
ECHO LA MAJOR AXIS: 6.5 CM
ECHO LA TO AORTIC ROOT RATIO: 1.19
ECHO LA VOL MOD A4C: 63 ML (ref 18–58)
ECHO LA VOLUME INDEX MOD A4C: 34 ML/M2 (ref 16–34)
ECHO LV E' LATERAL VELOCITY: 6.74 CM/S
ECHO LV E' SEPTAL VELOCITY: 4.35 CM/S
ECHO LV EF PHYSICIAN: 15 %
ECHO LV FRACTIONAL SHORTENING: 5 % (ref 28–44)
ECHO LV INTERNAL DIMENSION DIASTOLE INDEX: 2.16 CM/M2
ECHO LV INTERNAL DIMENSION DIASTOLIC: 4 CM (ref 4.2–5.9)
ECHO LV INTERNAL DIMENSION SYSTOLIC INDEX: 2.05 CM/M2
ECHO LV INTERNAL DIMENSION SYSTOLIC: 3.8 CM
ECHO LV IVSD: 1.1 CM (ref 0.6–1)
ECHO LV MASS 2D: 145.6 G (ref 88–224)
ECHO LV MASS INDEX 2D: 78.7 G/M2 (ref 49–115)
ECHO LV POSTERIOR WALL DIASTOLIC: 1.1 CM (ref 0.6–1)
ECHO LV RELATIVE WALL THICKNESS RATIO: 0.55
ECHO LVOT AREA: 3.1 CM2
ECHO LVOT AV VTI INDEX: 0.43
ECHO LVOT DIAM: 2 CM
ECHO LVOT MEAN GRADIENT: 0 MMHG
ECHO LVOT PEAK GRADIENT: 1 MMHG
ECHO LVOT PEAK VELOCITY: 0.5 M/S
ECHO LVOT STROKE VOLUME INDEX: 12.9 ML/M2
ECHO LVOT SV: 23.9 ML
ECHO LVOT VTI: 7.6 CM
ECHO MV A VELOCITY: 0.78 M/S
ECHO MV E DECELERATION TIME (DT): 131 MS
ECHO MV E VELOCITY: 0.69 M/S
ECHO MV E/A RATIO: 0.88
ECHO MV E/E' LATERAL: 10.24
ECHO MV E/E' RATIO (AVERAGED): 13.05
ECHO MV E/E' SEPTAL: 15.86
ECHO RIGHT VENTRICULAR SYSTOLIC PRESSURE (RVSP): 24 MMHG
ECHO RV BASAL DIMENSION: 4.8 CM
ECHO RV FREE WALL PEAK S': 11.7 CM/S
ECHO RV LONGITUDINAL DIMENSION: 6.5 CM
ECHO RV MID DIMENSION: 4.1 CM
ECHO RV TAPSE: 1.7 CM (ref 1.7–?)
ECHO TV REGURGITANT MAX VELOCITY: 1.47 M/S
ECHO TV REGURGITANT PEAK GRADIENT: 9 MMHG
EOSINOPHIL # BLD: 0 K/UL (ref 0–0.6)
EOSINOPHIL NFR BLD: 0 %
GFR SERPLBLD CREATININE-BSD FMLA CKD-EPI: >90 ML/MIN/{1.73_M2}
GLUCOSE SERPL-MCNC: 130 MG/DL (ref 70–99)
HCT VFR BLD AUTO: 43.1 % (ref 40.5–52.5)
HGB BLD-MCNC: 13.4 G/DL (ref 13.5–17.5)
LYMPHOCYTES # BLD: 1.7 K/UL (ref 1–5.1)
LYMPHOCYTES NFR BLD: 10.3 %
MCH RBC QN AUTO: 24.6 PG (ref 26–34)
MCHC RBC AUTO-ENTMCNC: 31 G/DL (ref 31–36)
MCV RBC AUTO: 79.5 FL (ref 80–100)
MONOCYTES # BLD: 1.5 K/UL (ref 0–1.3)
MONOCYTES NFR BLD: 9.4 %
NEUTROPHILS # BLD: 12.9 K/UL (ref 1.7–7.7)
NEUTROPHILS NFR BLD: 80.1 %
PLATELET # BLD AUTO: 199 K/UL (ref 135–450)
PMV BLD AUTO: 8.5 FL (ref 5–10.5)
POTASSIUM SERPL-SCNC: 4.8 MMOL/L (ref 3.5–5.1)
RBC # BLD AUTO: 5.42 M/UL (ref 4.2–5.9)
SODIUM SERPL-SCNC: 138 MMOL/L (ref 136–145)
VANCOMYCIN SERPL-MCNC: 5.4 UG/ML
WBC # BLD AUTO: 16.1 K/UL (ref 4–11)

## 2025-04-18 PROCEDURE — 93306 TTE W/DOPPLER COMPLETE: CPT

## 2025-04-18 PROCEDURE — 6360000002 HC RX W HCPCS

## 2025-04-18 PROCEDURE — 6360000002 HC RX W HCPCS: Performed by: NURSE PRACTITIONER

## 2025-04-18 PROCEDURE — 2580000003 HC RX 258: Performed by: INTERNAL MEDICINE

## 2025-04-18 PROCEDURE — 2060000000 HC ICU INTERMEDIATE R&B

## 2025-04-18 PROCEDURE — 85025 COMPLETE CBC W/AUTO DIFF WBC: CPT

## 2025-04-18 PROCEDURE — 93306 TTE W/DOPPLER COMPLETE: CPT | Performed by: INTERNAL MEDICINE

## 2025-04-18 PROCEDURE — 80048 BASIC METABOLIC PNL TOTAL CA: CPT

## 2025-04-18 PROCEDURE — 82550 ASSAY OF CK (CPK): CPT

## 2025-04-18 PROCEDURE — 6360000004 HC RX CONTRAST MEDICATION

## 2025-04-18 PROCEDURE — 74177 CT ABD & PELVIS W/CONTRAST: CPT

## 2025-04-18 PROCEDURE — 80202 ASSAY OF VANCOMYCIN: CPT

## 2025-04-18 PROCEDURE — 36415 COLL VENOUS BLD VENIPUNCTURE: CPT

## 2025-04-18 PROCEDURE — 6360000002 HC RX W HCPCS: Performed by: INTERNAL MEDICINE

## 2025-04-18 PROCEDURE — 2500000003 HC RX 250 WO HCPCS

## 2025-04-18 RX ORDER — LORAZEPAM 2 MG/ML
1 INJECTION INTRAMUSCULAR
Status: COMPLETED | OUTPATIENT
Start: 2025-04-18 | End: 2025-04-18

## 2025-04-18 RX ORDER — MORPHINE SULFATE 2 MG/ML
2 INJECTION, SOLUTION INTRAMUSCULAR; INTRAVENOUS EVERY 4 HOURS PRN
Refills: 0 | Status: DISPENSED | OUTPATIENT
Start: 2025-04-18 | End: 2025-04-20

## 2025-04-18 RX ORDER — IOPAMIDOL 755 MG/ML
100 INJECTION, SOLUTION INTRAVASCULAR
Status: COMPLETED | OUTPATIENT
Start: 2025-04-18 | End: 2025-04-18

## 2025-04-18 RX ORDER — LORAZEPAM 2 MG/ML
0.5 INJECTION INTRAMUSCULAR
Status: COMPLETED | OUTPATIENT
Start: 2025-04-18 | End: 2025-04-18

## 2025-04-18 RX ORDER — MORPHINE SULFATE 2 MG/ML
2 INJECTION, SOLUTION INTRAMUSCULAR; INTRAVENOUS
Status: DISCONTINUED | OUTPATIENT
Start: 2025-04-18 | End: 2025-04-18

## 2025-04-18 RX ORDER — BISACODYL 10 MG
10 SUPPOSITORY, RECTAL RECTAL DAILY PRN
Status: DISCONTINUED | OUTPATIENT
Start: 2025-04-18 | End: 2025-04-21 | Stop reason: HOSPADM

## 2025-04-18 RX ADMIN — MORPHINE SULFATE 2 MG: 2 INJECTION, SOLUTION INTRAMUSCULAR; INTRAVENOUS at 18:10

## 2025-04-18 RX ADMIN — LORAZEPAM 1 MG: 2 INJECTION INTRAMUSCULAR; INTRAVENOUS at 20:19

## 2025-04-18 RX ADMIN — VANCOMYCIN HYDROCHLORIDE 750 MG: 750 INJECTION, POWDER, LYOPHILIZED, FOR SOLUTION INTRAVENOUS at 13:46

## 2025-04-18 RX ADMIN — LORAZEPAM 0.5 MG: 2 INJECTION INTRAMUSCULAR; INTRAVENOUS at 03:13

## 2025-04-18 RX ADMIN — VANCOMYCIN HYDROCHLORIDE 750 MG: 750 INJECTION, POWDER, LYOPHILIZED, FOR SOLUTION INTRAVENOUS at 22:45

## 2025-04-18 RX ADMIN — MORPHINE SULFATE 2 MG: 2 INJECTION, SOLUTION INTRAMUSCULAR; INTRAVENOUS at 22:04

## 2025-04-18 RX ADMIN — WATER 2.5 MG: 1 INJECTION INTRAMUSCULAR; INTRAVENOUS; SUBCUTANEOUS at 10:36

## 2025-04-18 RX ADMIN — CEFEPIME 2000 MG: 2 INJECTION, POWDER, FOR SOLUTION INTRAVENOUS at 05:05

## 2025-04-18 RX ADMIN — CEFEPIME 2000 MG: 2 INJECTION, POWDER, FOR SOLUTION INTRAVENOUS at 16:19

## 2025-04-18 RX ADMIN — IOPAMIDOL 75 ML: 755 INJECTION, SOLUTION INTRAVENOUS at 17:06

## 2025-04-18 RX ADMIN — CEFEPIME 2000 MG: 2 INJECTION, POWDER, FOR SOLUTION INTRAVENOUS at 23:51

## 2025-04-18 ASSESSMENT — PAIN SCALES - GENERAL: PAINLEVEL_OUTOF10: 5

## 2025-04-18 NOTE — CARE COORDINATION
11:29 AM     Pt is from AL at [facility] and has an involved Nephew, Konrad. PT/OT evals are currently pending, SW awaiting evals to determine if pt can return to AL.     SW following.  Electronically signed by SHANELLE Livingston, KRYSTA, JOSEPH-SAVANNAH on 4/18/2025 at 11:29 AM  [phone]

## 2025-04-18 NOTE — PROGRESS NOTES
Pt awake in bed, alert and oriented to self at times, disoriented to situation, place, times. Pt orientation difficult to assess as pt is not always able to verbalize answers to questions. VSS on RA, pt exhibiting no s/s acute distress at this time. Pt failed swallow screen this shift, pt NPO for speech consult. Pt yet to ambulate, pt not ambulated d/t safety concern and confusion. Pt yet to void, pt bladder scanned as needed. Pt pain managed via MAR. Standard safety precautions in place, bed locked and in lowest position, bed/chair alarm on, gripper socks applied, bedside table/call light within reach, pt oriented to fall prevention measures. Pt gives no indication he has other needs at this time. Plan of care to continue.    Electronically signed by BRUCE LEDESMA RN on 4/18/2025 at 12:27 AM

## 2025-04-18 NOTE — PROGRESS NOTES
Speech Language Pathology  Facility/Department:[facility] 5T ORTHO/NEURO  *** Evaluation  Name: Angelito Hay  : 1940  MRN: [id number]                                                       Patient Diagnosis(es):   Patient Active Problem List    Diagnosis Date Noted    Subarachnoid hemorrhage (HCC) 2024    SAH (subarachnoid hemorrhage) (HCC) 2024       Past Medical History:   Diagnosis Date    Smoking 2024    POA provided information     No past surgical history on file.    Reason for Referral:  Angelito Hay  was referred for a Speech Therapy evaluation to assess swallow function and/or communication.    History of Present Illness  Per MD notes:  \"85 y.o. male with PMHx significant for dementia, hypertension who presented to ED after he had an unwitnessed fall at the nursing facility..  Patient was found down on the ground.  Patient was brought to ED for further evaluation.  CT of the head revealed acute subdural hematoma with left frontal intraparenchymal hematoma.  Patient has history of subarachnoid hemorrhage in 10/2024.  Patient cannot provide adequate history due to underlying dementia.  In ED labs were remarkable for troponin 1479 which is currently trending down, CK 1029, neurosurgery was consulted.  Patient currently being admitted to 5 tower stepdown unit for further management and evaluation.\"    Imaging  CT HEAD WO CONTRAST   Final Result      Stable small amount of subarachnoid and subdural hemorrhage at the left frontoparietal convexity, with or without hemorrhagic cortical contusion. No new hemorrhage or mass effect.      Electronically signed by Ceferino Sandoval MD          Date of onset: 25    Current Diet:  ADULT DIET; Regular  ADULT ORAL NUTRITION SUPPLEMENT; Breakfast, Dinner; Standard High Calorie/High Protein Oral Supplement      Treatment Diagnosis: dysphagia    Pain:  ***    General:  Chart reviewed: Yes  Behavior/Cognition: ***  Communication Observation: ***  Follows

## 2025-04-18 NOTE — PROGRESS NOTES
Speech-Language Pathology  Attempt x 2    Order received, chart reviewed, d/w RN who states pt lethargic from medications. Attempted to assess pt, however pt did not awaken sufficiently to assess.  It should be noted that pt was seen at OSH for MBS 9/17/24 and diet recommendations were minced/moist with Nectar thick liquids.  Unsure of diet prior to admit.   Will follow up as pt appropriate        SHARON CASON M.S./CCC-SLP #[id number]  Speech/language Pathologist  Pg. # [phone]      Addendum:  Per RN, pt remains lethargic.  RN states pt awakens briefly then falls right back to sleep.  Will follow up as pt more alert      SHARON CASON M.S./CCC-SLP #[id number]  Speech/language Pathologist  Pg. # [phone] 12:19 PM

## 2025-04-18 NOTE — PROGRESS NOTES
Physician Progress Note      PATIENT:               ASPEN MARTINEZ  CSN #:                  [id number]  :                       1940  ADMIT DATE:       2025 7:51 PM  DISCH DATE:  RESPONDING  PROVIDER #:        Ree Rivera MD          QUERY TEXT:    Traumatic brain injury is documented in the H/P 25. Please specify the   duration of the loss of consciousness related to the traumatic brain injury,   if any:    The clinical indicators include:  - H/P \" unwitnessed fall at the nursing facility.  Patient was found down   on the ground. Patient cannot provide adequate history due to underlying   dementia.\"   Neuro. notes \"CT of the head revealed tiny acute subdural hematoma with   left frontal intraparenchymal hematoma.\"  Per Neuro () No further imaging unless there is a decline in neurologic.   Hold all full dose anticoagulation & antiplatelet for 2 weeks. Neurologic   exams frequency: Q4H\"  Options provided:  -- LOC of unknown duration  -- Other - I will add my own diagnosis  -- Disagree - Not applicable / Not valid  -- Disagree - Clinically unable to determine / Unknown  -- Refer to Clinical Documentation Reviewer    PROVIDER RESPONSE TEXT:    This patient had LOC of unknown duration.    Query created by: Bertha White on 2025 3:12 PM      QUERY TEXT:    Please clarify the patient?s nutritional status:    The clinical indicators include:  - Ht.=190.5 cm  Wt.=60.3  BMI=16.62  Dietician notes  \"Underweight related to inadequate protein-energy intake   as evidenced by BMI\"  Options provided:  -- Underweight with BMI 16.62  -- Other - I will add my own diagnosis  -- Disagree - Not applicable / Not valid  -- Disagree - Clinically unable to determine / Unknown  -- Refer to Clinical Documentation Reviewer    PROVIDER RESPONSE TEXT:    Underweight with BMI 16.62    Query created by: Bertha White on 2025 2:28 PM      Electronically signed by:  Ree NÚÑEZ

## 2025-04-18 NOTE — PLAN OF CARE
Problem: Pain  Goal: Verbalizes/displays adequate comfort level or baseline comfort level  Outcome: Progressing   Assessing pain using numeric pain scale. Managing pain with meds per MAR. Using repositioining and pillow support for comfort.    Problem: Safety - Adult  Goal: Free from fall injury  Outcome: Progressing   Fall precautions in place. Bed alarm on and in lowest position. Call light, belongings, bedside table within reach.

## 2025-04-18 NOTE — PROGRESS NOTES
Pt unable to take part in admission documentation/questions at this time d/t mental status.    Electronically signed by BRUCE LEDESMA RN on 4/18/2025 at 1:31 AM

## 2025-04-18 NOTE — PROGRESS NOTES
Occupational /Physical Therapy  Hold   Pt remains lethargic this date. Unable to participate in therapy evals. Per RN pt unable to participate or follow commands at this time. Will follow up at later date/time.     Madhuri Canseco, MOT, OTR/L, CNS  Louise Gerard, PT [phone]

## 2025-04-18 NOTE — CONSULTS
[facility] -  Clinical Pharmacy Note    Vancomycin - Management by Pharmacy    Consult Date(s): 04/17/25  Consulting Provider(s): Dr. Camilo    Assessment / Plan  Sepsis of Unknown Etiology - Vancomycin  Concurrent Antimicrobials:   Cefepime    Day of Vanc Therapy / Ordered Duration: Day 2 of 7  Current Dosing Method: Bayesian-Guided AUC Dosing  Therapeutic Goal: -600 mg/L*hr  Current Dose / Plan:   SCr stable at 0.7  1500 mg IV load given 4/18  Level this am= 5.4 mg/L (drawn 21h after load)  750 mg IV q12h, ordered yesterday and intended to start last evening, was ordered to start this evening.  Continue  750 mg IV q12h, next dose now  Estimated AUCss ~ 460 mg/L*hr and troughss ~ 12.7 mg/L on this regimen  Plan to re-check level in 2-3 days, possibly sooner if clinically indicated  Will continue to monitor clinical condition and make adjustments to regimen as appropriate.    Thank you for consulting pharmacy,  Please call with any questions    Nicolle Lagunas  Pharm.D. [facility]  [phone] (Main Pharmacy)         Interval update:   Pt remains afebrile; O2 requirements increased, now on 4L. Blood cx -NGTD    Subjective/Objective:   Angelito Hay is a 85 y.o. male with a PMHx significant for dementia, HTN, recent SAH 10/2024.  Presented 4/16 after unwitnessed fall at NH- admitted with SDH and intraparenchymal hematomas, NSTEMI, rhabdomyolysis and sepsis. Started on broad spectrum antibiotics.    Pharmacy is consulted to dose vancomcyin.    Ht Readings from Last 1 Encounters:   04/16/25 1.905 m (6' 3\")     Wt Readings from Last 1 Encounters:   04/16/25 60.3 kg (133 lb)     Current & Prior Antimicrobial Regimen(s):  Cefepime - (4/17-current)  Vancomycin - Pharmacy to dose  1500 mg IV x1 (4/17)  750 mg IV q12h (4/18-current)    Vancomycin Level(s) / Doses:    Date Time Dose Type of Level / Level Interpretation   4/18 05:44 750 mg IV q12h Random= 5.4 mg/L ~21h after load.  750 mg q12h intended to start last dalia

## 2025-04-18 NOTE — PLAN OF CARE
Problem: Discharge Planning  Goal: Discharge to home or other facility with appropriate resources  4/17/2025 2010 by Juanjose Stack RN  Outcome: Progressing  Note: Collaboration with inter professional healthcare team to address pt discharge needs  4/17/2025 0737 by Faisal Stephenson RN  Outcome: Progressing     Problem: Pain  Goal: Verbalizes/displays adequate comfort level or baseline comfort level  4/17/2025 2010 by Juanjose Stack RN  Outcome: Progressing  4/17/2025 0737 by Faisal Stephenson RN  Outcome: Progressing  Note: No signs or symptoms of pain noted this AM.     Problem: Safety - Adult  Goal: Free from fall injury  4/17/2025 2010 by Juanjose Stack RN  Outcome: Progressing  Note: [place] safety precautions in place, bed locked and in lowest position, bed/chair alarm on, bedside table/call light within reach, gripper socks applied, pt oriented to fall prevention measures.    4/17/2025 0737 by Faisal Stephenson RN  Outcome: Progressing     Problem: Skin/Tissue Integrity  Goal: Skin integrity remains intact  Description: 1.  Monitor for areas of redness and/or skin breakdown2.  Assess vascular access sites hourly3.  Every 4-6 hours minimum:  Change oxygen saturation probe site4.  Every 4-6 hours:  If on nasal continuous positive airway pressure, respiratory therapy assess nares and determine need for appliance change or resting period  4/17/2025 2010 by Juanjose Stack RN  Outcome: Progressing  4/17/2025 0737 by Faisal Stephenson RN  Outcome: Progressing     Problem: ABCDS Injury Assessment  Goal: Absence of physical injury  4/17/2025 2010 by Juanjose Stack RN  Outcome: Progressing  4/17/2025 0737 by Faisal Stephenson RN  Outcome: Progressing  Note: Patient has remained free of physical injury this shift. Fall and safety precautions in place. Bed exit alarm activated, bed in lowest position with wheels locked, call light and belongings within reach.       Problem: Neurosensory -

## 2025-04-18 NOTE — PROGRESS NOTES
Hospital Medicine Progress Note      Date of Admission: 4/16/2025  Hospital Day: 3    Chief Admission Complaint: Fall     Subjective: Patient seen and examined at bedside  Patient was agitated earlier this morning  Was given low-dose Zyprexa with improvement    Presenting Admission History:       85 y.o. male with PMHx significant for dementia, hypertension who presented to ED after he had an unwitnessed fall at the nursing facility..  Patient was found down on the ground.  Patient was brought to ED for further evaluation.  CT of the head revealed acute subdural hematoma with left frontal intraparenchymal hematoma.  Patient has history of subarachnoid hemorrhage in 10/2024.  Patient cannot provide adequate history due to underlying dementia.  In ED labs were remarkable for troponin 1479 which is currently trending down, CK 1029, neurosurgery was consulted.  Patient currently being admitted to 5 tower stepdown unit for further management and evaluation.     Assessment/Plan:      Acute subdural hematoma  Acute right frontal and parietal intraparenchymal hematoma  History of subarachnoid hemorrhage 10/2024  Follow-up repeat CT, stable hemorrhage  Neurosurgery consulted, no further interventions unless there is decline in neurological status.  Hold full dose anticoagulation and antiplatelet therapy for 2 weeks.  PT and OT    S/p unwitnessed fall at nursing facility  NSTEMI  Troponinemia  Troponin peaked at 1400  Cardiology consulted, not a candidate for invasive cardiac diagnostic or therapeutic procedure at this time  Follow echo    Rhabdomyolysis, traumatic due to prolonged downtime  CK 1000 on presentation, downtrending  Continue IV fluids      Lactic acidosis  Concerns of sepsis  Lactate 4.2 on presentation, down trended  Blood culture no growth to date  UA negative for infection  Was started on Vanco and cefepime for concerns of infection, will continue to monitor and discontinue if an appropriate  Continue IV

## 2025-04-18 NOTE — PROGRESS NOTES
Pt A&Ox1 to self, VSS on 3L. Pt stood at bedside during shift. Pt voiding. Managing pain with PRN pain meds per MAR. Fall precautions in place. Bed alarm on and in lowest position. Plan of care continues.     Unable to complete admission documentation due to AMS.

## 2025-04-19 LAB
ALBUMIN SERPL-MCNC: 3.1 G/DL (ref 3.4–5)
ANION GAP SERPL CALCULATED.3IONS-SCNC: 8 MMOL/L (ref 3–16)
ANION GAP SERPL CALCULATED.3IONS-SCNC: 8 MMOL/L (ref 3–16)
BASOPHILS # BLD: 0 K/UL (ref 0–0.2)
BASOPHILS NFR BLD: 0.1 %
BUN SERPL-MCNC: 37 MG/DL (ref 7–20)
BUN SERPL-MCNC: 37 MG/DL (ref 7–20)
CALCIUM SERPL-MCNC: 9 MG/DL (ref 8.3–10.6)
CALCIUM SERPL-MCNC: 9.1 MG/DL (ref 8.3–10.6)
CHLORIDE SERPL-SCNC: 109 MMOL/L (ref 99–110)
CHLORIDE SERPL-SCNC: 109 MMOL/L (ref 99–110)
CO2 SERPL-SCNC: 27 MMOL/L (ref 21–32)
CO2 SERPL-SCNC: 27 MMOL/L (ref 21–32)
CREAT SERPL-MCNC: 0.6 MG/DL (ref 0.8–1.3)
CREAT SERPL-MCNC: 0.6 MG/DL (ref 0.8–1.3)
DEPRECATED RDW RBC AUTO: 18.9 % (ref 12.4–15.4)
EOSINOPHIL # BLD: 0 K/UL (ref 0–0.6)
EOSINOPHIL NFR BLD: 0 %
GFR SERPLBLD CREATININE-BSD FMLA CKD-EPI: >90 ML/MIN/{1.73_M2}
GFR SERPLBLD CREATININE-BSD FMLA CKD-EPI: >90 ML/MIN/{1.73_M2}
GLUCOSE SERPL-MCNC: 128 MG/DL (ref 70–99)
GLUCOSE SERPL-MCNC: 131 MG/DL (ref 70–99)
HCT VFR BLD AUTO: 35.6 % (ref 40.5–52.5)
HGB BLD-MCNC: 11.6 G/DL (ref 13.5–17.5)
LYMPHOCYTES # BLD: 1.1 K/UL (ref 1–5.1)
LYMPHOCYTES NFR BLD: 11.8 %
MAGNESIUM SERPL-MCNC: 2.02 MG/DL (ref 1.8–2.4)
MCH RBC QN AUTO: 24.9 PG (ref 26–34)
MCHC RBC AUTO-ENTMCNC: 32.6 G/DL (ref 31–36)
MCV RBC AUTO: 76.1 FL (ref 80–100)
MONOCYTES # BLD: 1 K/UL (ref 0–1.3)
MONOCYTES NFR BLD: 10.5 %
NEUTROPHILS # BLD: 7.3 K/UL (ref 1.7–7.7)
NEUTROPHILS NFR BLD: 77.6 %
PHOSPHATE SERPL-MCNC: 2.1 MG/DL (ref 2.5–4.9)
PLATELET # BLD AUTO: 162 K/UL (ref 135–450)
PMV BLD AUTO: 8.6 FL (ref 5–10.5)
POTASSIUM SERPL-SCNC: 4.3 MMOL/L (ref 3.5–5.1)
POTASSIUM SERPL-SCNC: 4.4 MMOL/L (ref 3.5–5.1)
RBC # BLD AUTO: 4.67 M/UL (ref 4.2–5.9)
SODIUM SERPL-SCNC: 144 MMOL/L (ref 136–145)
SODIUM SERPL-SCNC: 144 MMOL/L (ref 136–145)
WBC # BLD AUTO: 9.4 K/UL (ref 4–11)

## 2025-04-19 PROCEDURE — 36415 COLL VENOUS BLD VENIPUNCTURE: CPT

## 2025-04-19 PROCEDURE — 6360000002 HC RX W HCPCS: Performed by: INTERNAL MEDICINE

## 2025-04-19 PROCEDURE — 2580000003 HC RX 258: Performed by: INTERNAL MEDICINE

## 2025-04-19 PROCEDURE — 6370000000 HC RX 637 (ALT 250 FOR IP)

## 2025-04-19 PROCEDURE — 85025 COMPLETE CBC W/AUTO DIFF WBC: CPT

## 2025-04-19 PROCEDURE — 6360000002 HC RX W HCPCS

## 2025-04-19 PROCEDURE — 6370000000 HC RX 637 (ALT 250 FOR IP): Performed by: INTERNAL MEDICINE

## 2025-04-19 PROCEDURE — 80069 RENAL FUNCTION PANEL: CPT

## 2025-04-19 PROCEDURE — 2500000003 HC RX 250 WO HCPCS: Performed by: INTERNAL MEDICINE

## 2025-04-19 PROCEDURE — 6360000002 HC RX W HCPCS: Performed by: NURSE PRACTITIONER

## 2025-04-19 PROCEDURE — 51798 US URINE CAPACITY MEASURE: CPT

## 2025-04-19 PROCEDURE — 83735 ASSAY OF MAGNESIUM: CPT

## 2025-04-19 PROCEDURE — 2060000000 HC ICU INTERMEDIATE R&B

## 2025-04-19 RX ORDER — ACETAMINOPHEN 650 MG/1
650 SUPPOSITORY RECTAL EVERY 8 HOURS
Status: DISCONTINUED | OUTPATIENT
Start: 2025-04-19 | End: 2025-04-21 | Stop reason: HOSPADM

## 2025-04-19 RX ORDER — LORAZEPAM 2 MG/ML
1 INJECTION INTRAMUSCULAR
Status: COMPLETED | OUTPATIENT
Start: 2025-04-19 | End: 2025-04-19

## 2025-04-19 RX ADMIN — CEFEPIME 2000 MG: 2 INJECTION, POWDER, FOR SOLUTION INTRAVENOUS at 05:07

## 2025-04-19 RX ADMIN — ACETAMINOPHEN 650 MG: 650 SUPPOSITORY RECTAL at 12:35

## 2025-04-19 RX ADMIN — ACETAMINOPHEN 650 MG: 650 SUPPOSITORY RECTAL at 20:33

## 2025-04-19 RX ADMIN — LORAZEPAM 1 MG: 2 INJECTION INTRAMUSCULAR; INTRAVENOUS at 22:11

## 2025-04-19 RX ADMIN — SODIUM CHLORIDE, PRESERVATIVE FREE 10 ML: 5 INJECTION INTRAVENOUS at 09:36

## 2025-04-19 RX ADMIN — CEFEPIME 2000 MG: 2 INJECTION, POWDER, FOR SOLUTION INTRAVENOUS at 16:44

## 2025-04-19 RX ADMIN — VANCOMYCIN HYDROCHLORIDE 1000 MG: 1 INJECTION, POWDER, LYOPHILIZED, FOR SOLUTION INTRAVENOUS at 22:10

## 2025-04-19 RX ADMIN — VANCOMYCIN HYDROCHLORIDE 1000 MG: 1 INJECTION, POWDER, LYOPHILIZED, FOR SOLUTION INTRAVENOUS at 12:45

## 2025-04-19 RX ADMIN — MORPHINE SULFATE 2 MG: 2 INJECTION, SOLUTION INTRAMUSCULAR; INTRAVENOUS at 16:50

## 2025-04-19 RX ADMIN — MORPHINE SULFATE 2 MG: 2 INJECTION, SOLUTION INTRAMUSCULAR; INTRAVENOUS at 20:33

## 2025-04-19 ASSESSMENT — PAIN SCALES - PAIN ASSESSMENT IN ADVANCED DEMENTIA (PAINAD)
BREATHING: NORMAL
CONSOLABILITY: UNABLE TO CONSOLE, DISTRACT OR REASSURE
TOTALSCORE: 7
TOTALSCORE: 0
FACIALEXPRESSION: SAD, FRIGHTENED, FROWN
NEGVOCALIZATION: REPEATED TROUBLED CALLING OUT, LOUD MOANING/GROANING, CRYING
BODYLANGUAGE: RELAXED
CONSOLABILITY: DISTRACTED OR REASSURED BY VOICE/TOUCH
NEGVOCALIZATION: OCCASIONAL MOAN/GROAN, LOW SPEECH, NEGATIVE/DISAPPROVING QUALITY
BODYLANGUAGE: RIGID, FISTS CLENCHED, KNEES UP, PUSHING/PULLING AWAY, STRIKES OUT
FACIALEXPRESSION: SMILING OR INEXPRESSIVE
BODYLANGUAGE: RELAXED
CONSOLABILITY: NO NEED TO CONSOLE
BREATHING: NORMAL
CONSOLABILITY: NO NEED TO CONSOLE
TOTALSCORE: 0
TOTALSCORE: 6
FACIALEXPRESSION: SAD, FRIGHTENED, FROWN
BREATHING: NORMAL
FACIALEXPRESSION: SAD, FRIGHTENED, FROWN
BODYLANGUAGE: RIGID, FISTS CLENCHED, KNEES UP, PUSHING/PULLING AWAY, STRIKES OUT
CONSOLABILITY: NO NEED TO CONSOLE
FACIALEXPRESSION: SMILING OR INEXPRESSIVE
BREATHING: OCCASIONAL LABORED BREATHING, SHORT PERIOD OF HYPERVENTILATION
BODYLANGUAGE: RELAXED
TOTALSCORE: 1

## 2025-04-19 NOTE — PLAN OF CARE
Problem: Discharge Planning  Goal: Discharge to home or other facility with appropriate resources  Outcome: Progressing     Problem: Pain  Goal: Verbalizes/displays adequate comfort level or baseline comfort level  4/18/2025 2003 by Juanjose Stack RN  Outcome: Progressing  Note: Pt pain addressed via MAR  4/18/2025 1828 by Kitty Stafford RN  Outcome: Progressing     Problem: Safety - Adult  Goal: Free from fall injury  4/18/2025 2003 by Juanjose Stack RN  Outcome: Progressing  Note: [place] safety precautions in place, bed locked and in lowest position, bed/chair alarm on, bedside table/call light within reach, gripper socks applied, pt oriented to fall prevention measures.    4/18/2025 1828 by Kitty Stafford RN  Outcome: Progressing     Problem: Skin/Tissue Integrity  Goal: Skin integrity remains intact  Description: 1.  Monitor for areas of redness and/or skin breakdown2.  Assess vascular access sites hourly3.  Every 4-6 hours minimum:  Change oxygen saturation probe site4.  Every 4-6 hours:  If on nasal continuous positive airway pressure, respiratory therapy assess nares and determine need for appliance change or resting period  Outcome: Progressing     Problem: ABCDS Injury Assessment  Goal: Absence of physical injury  Outcome: Progressing     Problem: Neurosensory - Adult  Goal: Achieves stable or improved neurological status  Outcome: Progressing  Goal: Remains free of injury related to seizures activity  Outcome: Progressing     Problem: Respiratory - Adult  Goal: Achieves optimal ventilation and oxygenation  Outcome: Progressing     Problem: Musculoskeletal - Adult  Goal: Return ADL status to a safe level of function  Outcome: Progressing     Problem: Gastrointestinal - Adult  Goal: Maintains adequate nutritional intake  Outcome: Progressing     Problem: Genitourinary - Adult  Goal: Absence of urinary retention  Outcome: Progressing     Problem: Coping  Goal: Pt/Family able to

## 2025-04-19 NOTE — PROGRESS NOTES
Hospital Medicine Progress Note      Date of Admission: 4/16/2025  Hospital Day: 4    Chief Admission Complaint: Fall     Subjective: Patient seen and examined at bedside  Appears calm  Not following any commands    Presenting Admission History:       85 y.o. male with PMHx significant for dementia, hypertension who presented to ED after he had an unwitnessed fall at the nursing facility..  Patient was found down on the ground.  Patient was brought to ED for further evaluation.  CT of the head revealed acute subdural hematoma with left frontal intraparenchymal hematoma.  Patient has history of subarachnoid hemorrhage in 10/2024.  Patient cannot provide adequate history due to underlying dementia.  In ED labs were remarkable for troponin 1479 which is currently trending down, CK 1029, neurosurgery was consulted.  Patient currently being admitted to 5 tower stepdown unit for further management and evaluation.     Assessment/Plan:      Acute subdural hematoma  Acute right frontal and parietal intraparenchymal hematoma  History of subarachnoid hemorrhage 10/2024  Follow-up repeat CT, stable hemorrhage  Neurosurgery consulted, no further interventions unless there is decline in neurological status.  Hold full dose anticoagulation and antiplatelet therapy for 2 weeks.  PT and OT, unable to cooperate at this    S/p unwitnessed fall at nursing facility  NSTEMI  Troponinemia  Troponin peaked at 1400  Cardiology consulted, not a candidate for invasive cardiac diagnostic or therapeutic procedure at this time  Echo EF 10 to 15%, severe global hypokinesis    Rhabdomyolysis, traumatic due to prolonged downtime  CK 1000 on presentation, downtrending  Continue IV fluids    Concerns of dysphagia   Patient was choking on food on trial  Currently n.p.o.  Unable to cooperate with SLP at this time    Lactic acidosis  Concerns of sepsis  Lactate 4.2 on presentation, down trended  Blood culture no growth to date  UA negative for  infection  Was started on Vanco and cefepime for concerns of infection, will continue to monitor and discontinue if an appropriate  Continue IV fluids    Urinary retention  Was straight cathed once 4/17    Essential hypertension  On lisinopril    Dementia  A&O x 1 at baseline and lives in memory care unit    AAA, 6.4 cm 6/2024, following with vascular surgeon Dr. Wheeler  Patient was pounding out of his belly on 4/18 with concerns for pain.  CT abdomen pelvis revealed a large infrarenal aortic aneurysm with large amount of mural thrombus within the aneurysmal sac.  Surgery consulted do not recommend any intervention given his comorbidities.      Goals of care  Called POA  Primary Decision Maker: Rishi Hay - Brother/Sister - [phone]   Discussed extensively the patient's current status his mentation, inability to work with SLP and concerns of dysphagia and p.o. intake, high troponins with concerns of NSTEMI unable to do any invasive procedures as well as the AAA.  He understands the explanation and likely CODE STATUS changed to DNR CC.  On speaking about hospice he stated that he would needed help from his son Konrad Hay who is involved in the patient's care as well.  He gave permission to speak with him to discuss further.  Called Konrad Hay in the number listed in the chart, again discussed in entirety about patient's current condition.  He understands and agree with DNR CC.  Discussed about hospice to which he stated that he is interested but would like the patient to go to hospice back to his same facility.  Placed a hospice consult and call the  to help in finding the hospice care that is affiliated with his facility and proceed further.  Hospice to speak with family at bedside tomorrow at 8 AM to coordinate care.  Total time spent in discussion of goals of care with both the son and the nephew 45 minutes    Physical Exam Performed:      General appearance:  No apparent distress  Respiratory:

## 2025-04-19 NOTE — PROGRESS NOTES
Pt awake in bed, alert and oriented x self, pt unable to follow commands/verbalize. VSS on 3.5 L NC, pt exhibiting no s/s acute distress at this time. Pt NPO till speech. Pt not ambulated this shift d/t safety concern regarding confusion. Pt voiding. Pt pain managed via MAR. Standard safety precautions in place, bed locked and in lowest position, bed/chair alarm on, gripper socks applied, bedside table/call light within reach, pt oriented to fall prevention measures. Pt gives no indication he has other needs at this time. Plan of care to continue.    Electronically signed by BRUCE LEDESMA RN on 4/19/2025 at 12:19 AM

## 2025-04-19 NOTE — PROGRESS NOTES
Speech Language Pathology  Attempt    Angelito Hay  1940    Attempted to see pt for dysphagia evaluation. Pt is soundly asleep and SLP is unable to rouse despite verbal and tactile cues and sternal rubs. Pt continues to snore loudly throughout. Will attempt to follow-up as schedule allows.     Time: 0813    Second attempt: SLP spoke with KAYLEIGH Malone. Pt continues to be fatigued/lethargic and not appropriate for evaluation at this time.     Time: 1045    Dary Levy MS, CCC-SLP  [id number]  Speech-Language Pathologist  [facility]

## 2025-04-19 NOTE — PROGRESS NOTES
Pt unable to take part in admission documentation at this time d/t mental status    Electronically signed by BRUCE LEDESMA RN on 4/19/2025 at 4:28 AM

## 2025-04-19 NOTE — ACP (ADVANCE CARE PLANNING)
likely CODE STATUS changed to DNR CC.  On speaking about hospice he stated that he would needed help from his son Konrad Hay who is involved in the patient's care as well.  He gave permission to speak with him to discuss further.  Called Konrad Hay in the number listed in the chart, again discussed in entirety about patient's current condition.  He understands and agree with DNR CC.  Discussed about hospice to which he stated that he is interested but would like the patient to go to hospice back to his same facility.  Placed a hospice consult and call the  to help in finding the hospice care that is affiliated with his facility and proceed further.  Hospice to speak with family at bedside tomorrow at 8 AM to coordinate care.  Total time spent in discussion of goals of care with both the son and the nephew 45 minutes    Time spent on Advanced care Plannin minutes    Ree Rivera MD  2025 4:39 PM

## 2025-04-19 NOTE — PROGRESS NOTES
[facility] -  Clinical Pharmacy Note    Vancomycin - Management by Pharmacy    Consult Date(s): 04/17/25  Consulting Provider(s): Dr. Camilo    Assessment / Plan  Sepsis of Unknown Etiology - Vancomycin  Concurrent Antimicrobials:   Cefepime    Day of Vanc Therapy / Ordered Duration: Day 3 of 7  Current Dosing Method: Bayesian-Guided AUC Dosing  Therapeutic Goal: -600 mg/L*hr  Current Dose / Plan:   SCr stable at 0.6 today (slightly lower than yesterday)  UOP unmeasured  Currently receiving 750 mg IV q12h  Estimated AUCss ~ 405 mg/L*hr and troughss ~ 10.5 mg/L on this regimen  Prediction lower than yesterday with low probability of being therapeutic  Will plan to increase to 1000 mg IV Q12h  Predicted AUC = 535 mg/L*hr ; Trough = 14.1 mg/L  Plan to recheck level 4/20 AM to assess kinetics  Will continue to monitor clinical condition and make adjustments to regimen as appropriate.    Please call with any questions,  Kylee Nicole, PharmD  PGY1 Pharmacy Resident  Wireless: [phone]  4/19/2025 9:53 AM         Interval update:   Pt remains afebrile; O2 requirements back down to 2.5 L/min from 4L; leukocytosis resolved; CTAP shows left adrenal nodule (rec MRI), AAA of similar size to prior CT, R pleural effusion. Poor surgical candidate per surgery    Subjective/Objective:   Angelito Hay is a 85 y.o. male with a PMHx significant for dementia, HTN, recent SAH 10/2024, AAA (6.4 cm 2024), CHF (EF 10-15%).  Presented 4/16 after unwitnessed fall at NH- admitted with SDH and intraparenchymal hematomas, NSTEMI, rhabdomyolysis and sepsis. Started on broad spectrum antibiotics.    Pharmacy is consulted to dose vancomcyin.    Ht Readings from Last 1 Encounters:   04/16/25 1.905 m (6' 3\")     Wt Readings from Last 1 Encounters:   04/16/25 60.3 kg (133 lb)     Current & Prior Antimicrobial Regimen(s):  Cefepime - (4/17-current)  Vancomycin - Pharmacy to dose  1500 mg IV x1 (4/17)  750 mg IV q12h

## 2025-04-19 NOTE — PROGRESS NOTES
Patient not interactive and rarely responds to verbal or physical stimuli. Patient failed swallow screen this AM. This RN unable to palpate or use doppler to locate pedal pulses. Internal Medicine MD aware. Bladder scanned this shift: 169 mL. Two urine occurrences noted this shift. Fall and safety precautions in place.

## 2025-04-19 NOTE — PLAN OF CARE
Problem: Safety - Adult  Goal: Free from fall injury  Outcome: Progressing     Problem: ABCDS Injury Assessment  Goal: Absence of physical injury  Outcome: Progressing     Problem: Neurosensory - Adult  Goal: Achieves stable or improved neurological status  Outcome: Progressing     Problem: Respiratory - Adult  Goal: Achieves optimal ventilation and oxygenation  Outcome: Progressing

## 2025-04-19 NOTE — CONSULTS
Vascular Surgery   Resident Consult Note    Reason for Consult: Infrarenal abdominal aortic aneurysm     History of Present Illness:   Angelito Hay is a 85 y.o. male with Hx of dementia living in facility, CHF (EF 10-15%),  SAH presents to [facility] after fall in facility on 4/16. Vascular surgery consulted today for infrarenal abdominal aortic aneurysm found on CT, measuring 6.1 x 5.4 cm. Patient is demented and is not able to communicate. He does not appear to be in pain as he his laying comfortably in bed. Patient has seen vascular surgery, Dr. Wheeler October 2024. CTA at this time showed a bilobed infrarenal abdominal aortic aneurysm measuring 6.4 cm in diameter. At this time Dr. Wheeler recommended observation as vessels are tortuous and that recovering from a surgical intervention may be difficult on the patient. CT 4/18/25 shows infrarenal abdominal aortic aneurysm measuring 5.4 x 6.1 cm with mural thrombus in aneurysmal sac with no signs of rupture or dissection.     Patient's blood pressures are controlled at this time. Labs are significant for leukocytosis of 16.1 from 11.3 and troponins in the 1200s, elevated LFTs.     Past Medical History:        Diagnosis Date    Smoking 08/27/2024    POA provided information       Past Surgical History:    No past surgical history on file.    Allergies:  Patient has no known allergies.    Medications:   Home Meds  No current facility-administered medications on file prior to encounter.     Current Outpatient Medications on File Prior to Encounter   Medication Sig Dispense Refill    vitamin D (CHOLECALCIFEROL) 50 MCG (2000 UT) CAPS capsule Take 1 capsule by mouth daily      lisinopril (PRINIVIL;ZESTRIL) 10 MG tablet Take 1 tablet by mouth daily 30 tablet 2       Current Meds  vancomycin (VANCOCIN) 750 mg in sodium chloride 0.9 % 250 mL IVPB ([id number]), Q12H  bisacodyl (DULCOLAX) suppository 10 mg, Daily PRN  morphine (PF) injection 2 mg, Q4H PRN  LORazepam (ATIVAN) injection 1

## 2025-04-19 NOTE — PROGRESS NOTES
Occupational Therapy/Physical Therapy  Chart reviewed, discussed with nurse.  Nurse advises to hold therapy this AM secondary to pt being difficult to arouse and is being cleaned up.  Will follow up later today as schedule allows.  TIGRE Camacho, OTR/L [id number]   Citlalli Campuzano PT, DPT, NCS, CSRS

## 2025-04-19 NOTE — CARE COORDINATION
Deborah from [facility] called and family meeting wll occur at 080 on 4/20 here at hospital tomorrow. Electronically signed by Jacklyn Juárez RN on 4/19/2025 at 11:51 AM

## 2025-04-19 NOTE — CARE COORDINATION
Called [facility] ( [phone]) for patient to go to [place] with hospice services.   Patient lives at [facility] [place] [address]  [place]: [phone]  [facility] nurse will return call.   Electronically signed by Jacklyn Juárez RN on 4/19/2025 at 11:28 AM    Addendum:   [facility] nurse called this CM and she will talk to family member , Konrad (nephew- [phone]) and let me know when they will meet with family. Faxed referral order to [facility] at [phone]. Awaiting phone call and plan time of meeting with family. Electronically signed by Jacklyn Juárez RN on 4/19/2025 at 11:41 AM

## 2025-04-20 LAB
ANION GAP SERPL CALCULATED.3IONS-SCNC: 19 MMOL/L (ref 3–16)
BUN SERPL-MCNC: 39 MG/DL (ref 7–20)
CALCIUM SERPL-MCNC: 9.6 MG/DL (ref 8.3–10.6)
CHLORIDE SERPL-SCNC: 109 MMOL/L (ref 99–110)
CO2 SERPL-SCNC: 18 MMOL/L (ref 21–32)
CREAT SERPL-MCNC: 0.6 MG/DL (ref 0.8–1.3)
GFR SERPLBLD CREATININE-BSD FMLA CKD-EPI: >90 ML/MIN/{1.73_M2}
GLUCOSE SERPL-MCNC: 112 MG/DL (ref 70–99)
POTASSIUM SERPL-SCNC: 4.9 MMOL/L (ref 3.5–5.1)
SODIUM SERPL-SCNC: 146 MMOL/L (ref 136–145)
VANCOMYCIN SERPL-MCNC: 14.4 UG/ML

## 2025-04-20 PROCEDURE — 6370000000 HC RX 637 (ALT 250 FOR IP)

## 2025-04-20 PROCEDURE — 80202 ASSAY OF VANCOMYCIN: CPT

## 2025-04-20 PROCEDURE — 36415 COLL VENOUS BLD VENIPUNCTURE: CPT

## 2025-04-20 PROCEDURE — 6370000000 HC RX 637 (ALT 250 FOR IP): Performed by: INTERNAL MEDICINE

## 2025-04-20 PROCEDURE — 80048 BASIC METABOLIC PNL TOTAL CA: CPT

## 2025-04-20 PROCEDURE — 6360000002 HC RX W HCPCS: Performed by: INTERNAL MEDICINE

## 2025-04-20 PROCEDURE — 2060000000 HC ICU INTERMEDIATE R&B

## 2025-04-20 PROCEDURE — 51798 US URINE CAPACITY MEASURE: CPT

## 2025-04-20 PROCEDURE — 2500000003 HC RX 250 WO HCPCS: Performed by: INTERNAL MEDICINE

## 2025-04-20 PROCEDURE — 2580000003 HC RX 258: Performed by: INTERNAL MEDICINE

## 2025-04-20 RX ADMIN — VANCOMYCIN HYDROCHLORIDE 1000 MG: 1 INJECTION, POWDER, LYOPHILIZED, FOR SOLUTION INTRAVENOUS at 11:12

## 2025-04-20 RX ADMIN — ACETAMINOPHEN 650 MG: 650 SUPPOSITORY RECTAL at 10:19

## 2025-04-20 RX ADMIN — CEFEPIME 2000 MG: 2 INJECTION, POWDER, FOR SOLUTION INTRAVENOUS at 16:00

## 2025-04-20 RX ADMIN — ACETAMINOPHEN 650 MG: 650 SUPPOSITORY RECTAL at 10:48

## 2025-04-20 RX ADMIN — CEFEPIME 2000 MG: 2 INJECTION, POWDER, FOR SOLUTION INTRAVENOUS at 07:36

## 2025-04-20 RX ADMIN — SODIUM CHLORIDE, PRESERVATIVE FREE 10 ML: 5 INJECTION INTRAVENOUS at 10:20

## 2025-04-20 RX ADMIN — CEFEPIME 2000 MG: 2 INJECTION, POWDER, FOR SOLUTION INTRAVENOUS at 00:36

## 2025-04-20 RX ADMIN — VANCOMYCIN HYDROCHLORIDE 1000 MG: 1 INJECTION, POWDER, LYOPHILIZED, FOR SOLUTION INTRAVENOUS at 23:23

## 2025-04-20 NOTE — DISCHARGE SUMMARY
V2.0  Discharge Summary    Name:  Angelito Hay /Age/Sex: 1940 (85 y.o. male)   Admit Date: 2025  Discharge Date: 25    MRN & CSN:  [id number] & [id number] Encounter Date and Time 25 12:07 PM EDT    Attending:  Ree Rivera,* Discharging Provider: Ree Rivera MD       Hospital Course:     Brief HPI: Angelito Hay is a 85 y.o. male with PMHx significant for dementia, hypertension who presented to ED after he had an unwitnessed fall at the nursing facility.. Patient was found down on the ground. Patient was brought to ED for further evaluation. CT of the head revealed acute subdural hematoma with left frontal intraparenchymal hematoma. Patient has history of subarachnoid hemorrhage in 10/2024. Patient cannot provide adequate history due to underlying dementia. In ED labs were remarkable for troponin 1479 which is currently trending down, CK 1029, neurosurgery was consulted. Patient currently being admitted to 5 tower stepdown unit for further management and evaluation.     Brief Problem Based Course:   Acute subdural hematoma  Acute right frontal and parietal intraparenchymal hematoma  History of subarachnoid hemorrhage 10/2024  Follow-up repeat CT, stable hemorrhage  Neurosurgery consulted, no further interventions unless there is decline in neurological status.  Hold full dose anticoagulation and antiplatelet therapy for 2 weeks.  PT and OT, unable to cooperate at this     S/p unwitnessed fall at nursing facility  NSTEMI  Troponinemia  Troponin peaked at 1400  Cardiology consulted, not a candidate for invasive cardiac diagnostic or therapeutic procedure at this time  Echo EF 10 to 15%, severe global hypokinesis     Rhabdomyolysis, traumatic due to prolonged downtime  CK 1000 on presentation, downtrending  Continue IV fluids     Concerns of dysphagia   Patient was choking on food on trial  Currently n.p.o.  Unable to cooperate with SLP at this time     Lactic

## 2025-04-20 NOTE — PLAN OF CARE
Problem: Safety - Adult  Goal: Free from fall injury  4/20/2025 0845 by Duane Martin, RN  Outcome: Progressing   Pt remains free from injury during this shift. Pt is up x 1 assist, gait belt. Call light is in reach, bed alarm is activated for safety, bed locked and in lowest position. Will continue to monitor.    Problem: Pain  Goal: Verbalizes/displays adequate comfort level or baseline comfort level  4/20/2025 0845 by Duane Martin, RN  Outcome: Progressing   Medicated pt per orders, please see e-Mar. Will continue to monitor for comfort.    Problem: Skin/Tissue Integrity  Goal: Skin integrity remains intact  4/20/2025 0845 by Duane Martin, RN  Outcome: Progressing   Pt has scattered bruising/abrasions. Turn pt frequently. Pt has decreased perfusion to BUE/BLE, redness to his buttocks\"sacral heart in place\". Will continue to monitor.

## 2025-04-20 NOTE — PROGRESS NOTES
ASHIA patients orientation status to pt being mostly nonverbal, VSS on 3L, and bedrest. Pt is voiding via external catheter, NPO d/t difficulty swallowing, and pain is being managed with PRN pain meds. Pt going to hospice tomorrow. All safety precautions in place, plan of care continues.

## 2025-04-20 NOTE — CARE COORDINATION
CM following fo  d/c planning:    CM spoke with  [facility]  Liaison, Meghan, [phone], ( office [phone])  who confirmed they met with pt and  nephew and agreeable to  d/c with Hospice  services  and getting consents  signed, Plan to  deliver DME , Hospital bed , Bedside table  , Oxygen and other and have arranged  EMS transport  for  1400  on Monday 4/21/2025  . )     D/C Plan:   [facility]  for patient to go to [place] with hospice services.   Patient lives [place] [facility] [place] [address]  Phone: [phone]      Electronically signed by Crystal Burks RN on 4/20/2025 at 11:34 AM    Crystal Burks RN Case Manager  [facility] [place] [address]  [facility] [address] [phone]  W/E Coverage.  Fax [phone]

## 2025-04-20 NOTE — PROGRESS NOTES
[facility] -  Clinical Pharmacy Note    Vancomycin - Management by Pharmacy    Consult Date(s): 04/17/25  Consulting Provider(s): Dr. Camilo    Assessment / Plan  Sepsis of Unknown Etiology - Vancomycin  Concurrent Antimicrobials:   Cefepime    Day of Vanc Therapy / Ordered Duration: Day 4 of 7  Current Dosing Method: Bayesian-Guided AUC Dosing  Therapeutic Goal: -600 mg/L*hr  Current Dose / Plan:   SCr stable at 0.6 again today   UOP unmeasured x2  Currently receiving 1000 mg IV Q12h  Random level this AM = 14.4 mg/L (~7.5h post dose)  Calculated AUC = 488 mg/L*hr ; Trough = 12.4 mg/L  Plan to continue current dose and recheck level ~48h unless otherwise clinically indicated to assess kinetics. Patient may not need further levels pending clinical plans/ duration of therapy  Will continue to monitor clinical condition and make adjustments to regimen as appropriate.    Please call with any questions,  Kylee Nicole, PharmD  PGY1 Pharmacy Resident  Wireless: [phone]  4/20/2025 8:23 AM         Interval update:   Pt remains afebrile; now on RA; leukocytosis resolved yesterday - no CBC today; CTAP shows left adrenal nodule (rec MRI), AAA of similar size to prior CT, R pleural effusion. Poor surgical candidate per surgery; failed swallow eval yesterday, not following commands, switched to DNR CC, plans to discharge with Hospice    Subjective/Objective:   Angelito Hay is a 85 y.o. male with a PMHx significant for dementia, HTN, recent SAH 10/2024, AAA (6.4 cm 2024), CHF (EF 10-15%).  Presented 4/16 after unwitnessed fall at NH- admitted with SDH and intraparenchymal hematomas, NSTEMI, rhabdomyolysis and sepsis. Started on broad spectrum antibiotics.    Pharmacy is consulted to dose vancomcyin.    Ht Readings from Last 1 Encounters:   04/16/25 1.905 m (6' 3\")     Wt Readings from Last 1 Encounters:   04/16/25 60.3 kg (133 lb)     Current & Prior Antimicrobial Regimen(s):  Cefepime -

## 2025-04-20 NOTE — PROGRESS NOTES
Occupational Therapy / Physical Therapy  SIGN OFF    OT / PT orders rec'd, chart reviewed. Per chart- pt's family plan is transition to hospice care. OT / PT signing off, no eval; RN aware.    Jami Llanos, MOT-OTR/L [id number]    Breezy Mcnamara, PT, DPT

## 2025-04-20 NOTE — PROGRESS NOTES
Speech-Language Pathology    Re-attempted to see patient for dysphagia evaluation, however he remains too lethargic to participate. Additionally, spoke with family bedside, and they are planning to transition pt to hospice, so will sign off. D/w RN.    Elizabeth Hanley, SLP, [id number]  Ph. # [phone]

## 2025-04-20 NOTE — DISCHARGE INSTR - COC
Continuity of Care Form    Patient Name: Angelito Hay   :  1940  MRN:  [id number]    Admit date:  2025  Discharge date:  2025    Code Status Order: DNR-CC   Advance Directives:     Admitting Physician:  Ree FERRER MD  PCP: No primary care provider on file.    Discharging Nurse: Pat Love RN  Discharging Hospital Unit/Room#: 5506/5506-01  Discharging Unit Phone Number: [phone]    Emergency Contact:   Extended Emergency Contact Information  Primary Emergency Contact: Konrad Hay  Home Phone: [phone]  Mobile Phone: [phone]  Relation: Niece/Nephew   needed? No  Secondary Emergency Contact: Fabian Washington  Home Phone: [phone]  Mobile Phone: [phone]  Relation: Other Relative   needed? No    Past Surgical History:  No past surgical history on file.    Immunization History:   Immunization History   Administered Date(s) Administered    COVID-19, PFIZER PURPLE top, DILUTE for use, (age 12 y+), 30mcg/0.3mL 2021, 2021    TDaP, ADACEL (age 10y-64y), BOOSTRIX (age 10y+), IM, 0.5mL 2024       Active Problems:  Patient Active Problem List   Diagnosis Code    SAH (subarachnoid hemorrhage) (HCC) I60.9    Subarachnoid hemorrhage (HCC) I60.9       Isolation/Infection:   Isolation            No Isolation          Patient Infection Status    None to display         Nurse Assessment:  Last Vital Signs: BP (!) 112/93   Pulse 95   Temp 97.5 °F (36.4 °C) (Axillary)   Resp 20   Ht 1.905 m (6' 3\")   Wt 60.3 kg (133 lb)   SpO2 98%   BMI 16.62 kg/m²     Last documented pain score (0-10 scale): Pain Level:  (ASHIA pt sleeping)  Last Weight:   Wt Readings from Last 1 Encounters:   25 60.3 kg (133 lb)     Mental Status:  disoriented    IV Access:  - None    Nursing Mobility/ADLs:  Walking   Assisted  Transfer  Dependent  Bathing  Dependent  Dressing  Dependent  Toileting  Dependent  Feeding  Dependent  Med Admin  Dependent  Med Delivery

## 2025-04-21 VITALS
TEMPERATURE: 97.9 F | WEIGHT: 133 LBS | RESPIRATION RATE: 20 BRPM | HEIGHT: 75 IN | OXYGEN SATURATION: 90 % | SYSTOLIC BLOOD PRESSURE: 152 MMHG | HEART RATE: 93 BPM | BODY MASS INDEX: 16.54 KG/M2 | DIASTOLIC BLOOD PRESSURE: 104 MMHG

## 2025-04-21 LAB
ANION GAP SERPL CALCULATED.3IONS-SCNC: 16 MMOL/L (ref 3–16)
BACTERIA BLD CULT ORG #2: NORMAL
BACTERIA BLD CULT: NORMAL
BUN SERPL-MCNC: 41 MG/DL (ref 7–20)
CALCIUM SERPL-MCNC: 9.8 MG/DL (ref 8.3–10.6)
CHLORIDE SERPL-SCNC: 112 MMOL/L (ref 99–110)
CO2 SERPL-SCNC: 23 MMOL/L (ref 21–32)
CREAT SERPL-MCNC: 0.7 MG/DL (ref 0.8–1.3)
GFR SERPLBLD CREATININE-BSD FMLA CKD-EPI: >90 ML/MIN/{1.73_M2}
GLUCOSE SERPL-MCNC: 154 MG/DL (ref 70–99)
POTASSIUM SERPL-SCNC: 4.2 MMOL/L (ref 3.5–5.1)
SODIUM SERPL-SCNC: 151 MMOL/L (ref 136–145)

## 2025-04-21 PROCEDURE — 6360000002 HC RX W HCPCS

## 2025-04-21 PROCEDURE — 2500000003 HC RX 250 WO HCPCS: Performed by: INTERNAL MEDICINE

## 2025-04-21 PROCEDURE — 6360000002 HC RX W HCPCS: Performed by: INTERNAL MEDICINE

## 2025-04-21 PROCEDURE — 2580000003 HC RX 258: Performed by: INTERNAL MEDICINE

## 2025-04-21 PROCEDURE — 6370000000 HC RX 637 (ALT 250 FOR IP)

## 2025-04-21 PROCEDURE — 36415 COLL VENOUS BLD VENIPUNCTURE: CPT

## 2025-04-21 PROCEDURE — 80048 BASIC METABOLIC PNL TOTAL CA: CPT

## 2025-04-21 RX ORDER — LORAZEPAM 2 MG/ML
1 CONCENTRATE ORAL
Qty: 10 ML | Refills: 0 | Status: SHIPPED | OUTPATIENT
Start: 2025-04-21 | End: 2025-04-24

## 2025-04-21 RX ORDER — LORAZEPAM 2 MG/ML
1 CONCENTRATE ORAL
Status: DISCONTINUED | OUTPATIENT
Start: 2025-04-21 | End: 2025-04-21 | Stop reason: HOSPADM

## 2025-04-21 RX ORDER — MORPHINE SULFATE 20 MG/ML
10 SOLUTION ORAL
Refills: 0 | Status: DISCONTINUED | OUTPATIENT
Start: 2025-04-21 | End: 2025-04-21 | Stop reason: HOSPADM

## 2025-04-21 RX ORDER — LORAZEPAM 2 MG/ML
1 INJECTION INTRAMUSCULAR
Status: COMPLETED | OUTPATIENT
Start: 2025-04-21 | End: 2025-04-21

## 2025-04-21 RX ORDER — MORPHINE SULFATE 20 MG/ML
10 SOLUTION ORAL
Qty: 18 ML | Refills: 0 | Status: SHIPPED | OUTPATIENT
Start: 2025-04-21 | End: 2025-04-24

## 2025-04-21 RX ADMIN — CEFEPIME 2000 MG: 2 INJECTION, POWDER, FOR SOLUTION INTRAVENOUS at 01:15

## 2025-04-21 RX ADMIN — CEFEPIME 2000 MG: 2 INJECTION, POWDER, FOR SOLUTION INTRAVENOUS at 08:13

## 2025-04-21 RX ADMIN — ACETAMINOPHEN 650 MG: 650 SUPPOSITORY RECTAL at 11:25

## 2025-04-21 RX ADMIN — VANCOMYCIN HYDROCHLORIDE 1000 MG: 1 INJECTION, POWDER, LYOPHILIZED, FOR SOLUTION INTRAVENOUS at 12:21

## 2025-04-21 RX ADMIN — LORAZEPAM 1 MG: 2 INJECTION INTRAMUSCULAR; INTRAVENOUS at 13:54

## 2025-04-21 RX ADMIN — SODIUM CHLORIDE, PRESERVATIVE FREE 10 ML: 5 INJECTION INTRAVENOUS at 08:14

## 2025-04-21 NOTE — CARE COORDINATION
Case Management Assessment            Discharge Note                    Date / Time of Note: 4/21/2025 9:21 AM                  Discharge Note Completed by: SHANELLE Lovelace    Patient Name: Angelito Hay   YOB: 1940  Diagnosis: Subarachnoid hemorrhage (HCC) [I60.9]   Date / Time: 4/16/2025  7:51 PM    Current PCP: No primary care provider on file.  Clinic patient: No    Hospitalization in the last 30 days: No       Advance Directives:  Code Status: DNR-CC  [place] DNR form completed and on chart: Yes    Financial:  Payor: MEDICARE / Plan: MEDICARE PART A AND B / Product Type: *No Product type* /      Pharmacy:  No Pharmacies Listed    Assistance purchasing medications?: Potential Assistance Purchasing Medications: No  Assistance provided by Case Management: None at this time    Does patient want to participate in local refill/ meds to beds program?: No    Meds To Beds General Rules:  1. Can ONLY be done Monday- Friday between 8:30am-5pm  2. Prescription(s) must be in pharmacy by 3pm to be filled same day  3.Copy of patient's insurance/ prescription drug card and patient face sheet must be sent along with the prescription(s)  4. Cost of Rx cannot be added to hospital bill. If financial assistance is needed, please contact unit  or ;  or  CANNOT provide pharmacy voucher for patients co-pays  5. Patients can then  the prescription on their way out of the hospital at discharge, or pharmacy can deliver to the bedside if staff is available. (payment due at time of pick-up or delivery - cash, check, or card accepted)     Able to afford home medications/ co-pay costs: Yes    ADLS:  Current PT AM-PAC Score:   /24  Current OT AM-PAC Score:   /24    DISCHARGE Disposition: Home with Hospice: [facility]     LOC at discharge: Not Applicable  RADHA Completed: Yes    Notification completed in HENS/PAS?:  Not

## 2025-04-21 NOTE — PLAN OF CARE
Problem: Pain  Goal: Verbalizes/displays adequate comfort level or baseline comfort level  4/21/2025 0821 by Duane Martin, RN  Outcome: Progressing   No grimacing or crying out at this time. Will continue to monitor for comfort.    Problem: Safety - Adult  Goal: Free from fall injury  4/21/2025 0821 by Duane Martin, RN  Outcome: Progressing   Pt remains free from injury during this shift. Pt is an assist of 2 person. Pt currently not up on my shift. Call light is in reach, bed alarm is activated for safety, bed locked and in lowest position. Will continue to monitor.    Problem: Skin/Tissue Integrity  Goal: Skin integrity remains intact  4/21/2025 0821 by Duane Martin, RN  Outcome: Progressing   Pt has multiple abrasions and scattered bruising/redness. Mepilex in place. Pt is turned Q 2 hrs. Pericare performed after incontinent episodes. Will continue to monitor.

## 2025-04-21 NOTE — PLAN OF CARE
Problem: Discharge Planning  Goal: Discharge to home or other facility with appropriate resources  Outcome: Progressing  Pt to be discharged to hospice care today.      Problem: Pain  Goal: Verbalizes/displays adequate comfort level or baseline comfort level  Outcome: Progressing  Pt's pain being managed with PRN meds per MAR.      Problem: Safety - Adult  Goal: Free from fall injury  Outcome: Progressing  All fall precautions in place. Bed locked and in lowest position with alarm on. Overbed table and personal belonings within reach. Call light within reach and patient instructed to use call light for assistance. Non-skid socks on.      Problem: Skin/Tissue Integrity  Goal: Skin integrity remains intact  Description: 1.  Monitor for areas of redness and/or skin breakdown2.  Assess vascular access sites hourly3.  Every 4-6 hours minimum:  Change oxygen saturation probe site4.  Every 4-6 hours:  If on nasal continuous positive airway pressure, respiratory therapy assess nares and determine need for appliance change or resting period  Outcome: Progressing   Pt has some skin integrity concerns, will continue to turn pt and prevent skin breakdown.

## 2025-04-21 NOTE — PROGRESS NOTES
[facility] -  Clinical Pharmacy Note    Vancomycin - Management by Pharmacy    Consult Date(s): 04/17/25  Consulting Provider(s): Dr. Camilo    Assessment / Plan  Sepsis of Unknown Etiology - Vancomycin  Concurrent Antimicrobials:   Cefepime    Day of Vanc Therapy / Ordered Duration: Day 5 of 7  Current Dosing Method: Bayesian-Guided AUC Dosing  Therapeutic Goal: -600 mg/L*hr  Current Dose / Plan:   Renal function stable; SCr 0.7  On 1000mg IV q12h  Calculated AUC = 568 / trough = 15.3 based on level 4/20.  Continue current dose.  Will continue to monitor clinical condition and make adjustments to regimen as appropriate.    Please call with questions--  Michell Srinivasan, PharmD, BCPS  Wireless: [phone]   4/21/2025 8:38 AM           Interval update:   MAT working on discharge back to assisted living with hospice today.    Subjective/Objective:   Angelito Hay is a 85 y.o. male with a PMHx significant for dementia, HTN, recent SAH 10/2024, AAA (6.4 cm 2024), CHF (EF 10-15%).  Presented 4/16 after unwitnessed fall at NH- admitted with SDH and intraparenchymal hematomas, NSTEMI, rhabdomyolysis and sepsis. Started on broad spectrum antibiotics.    Pharmacy is consulted to dose vancomcyin.    Ht Readings from Last 1 Encounters:   04/16/25 1.905 m (6' 3\")     Wt Readings from Last 1 Encounters:   04/16/25 60.3 kg (133 lb)     Current & Prior Antimicrobial Regimen(s):  Cefepime - (4/17-current)  Vancomycin - Pharmacy to dose  1500 mg IV x1 (4/17)  750 mg IV q12h (4/18-4/19)  1000 mg IV q12h (4/19-current)    Vancomycin Level(s) / Doses:  Date Time Dose Type of Level / Level Interpretation   4/18 05:44 750 mg IV q12h Random= 5.4 mg/L ~21h after load.  750 mg q12h intended to start last dalia was entered to start this dalia. Re-ordered same dose; /Tr 12.7   4/20 0536 1000 mg IV Q12h Random = 14.4 mg/L ~7.5h post dose  AUC = 488 ; Tr = 12.5  continue   Note: Serum levels collected for AUC-based dosing may be

## 2025-04-21 NOTE — PROGRESS NOTES
Hospital Medicine Progress Note      Date of Admission: 4/16/2025  Hospital Day: 5    Chief Admission Complaint: Fall     Subjective: Patient seen and examined at bedside  Appears calm  Not following any commands    Presenting Admission History:       85 y.o. male with PMHx significant for dementia, hypertension who presented to ED after he had an unwitnessed fall at the nursing facility..  Patient was found down on the ground.  Patient was brought to ED for further evaluation.  CT of the head revealed acute subdural hematoma with left frontal intraparenchymal hematoma.  Patient has history of subarachnoid hemorrhage in 10/2024.  Patient cannot provide adequate history due to underlying dementia.  In ED labs were remarkable for troponin 1479 which is currently trending down, CK 1029, neurosurgery was consulted.  Patient currently being admitted to 5 tower stepdown unit for further management and evaluation.     Assessment/Plan:      Acute subdural hematoma  Acute right frontal and parietal intraparenchymal hematoma  History of subarachnoid hemorrhage 10/2024  Follow-up repeat CT, stable hemorrhage  Neurosurgery consulted, no further interventions unless there is decline in neurological status.  Hold full dose anticoagulation and antiplatelet therapy for 2 weeks.  PT and OT, unable to cooperate at this    S/p unwitnessed fall at nursing facility  NSTEMI  Troponinemia  Troponin peaked at 1400  Cardiology consulted, not a candidate for invasive cardiac diagnostic or therapeutic procedure at this time  Echo EF 10 to 15%, severe global hypokinesis    Rhabdomyolysis, traumatic due to prolonged downtime  CK 1000 on presentation, downtrending  Continue IV fluids    Concerns of dysphagia   Patient was choking on food on trial  Currently n.p.o.  Unable to cooperate with SLP at this time    Lactic acidosis  Concerns of sepsis  Lactate 4.2 on presentation, down trended  Blood culture no growth to date  UA negative for  Rhythm Strip (Telemetry) personally reviewed and interpreted as documented above    [] Imaging personally reviewed and interpreted, includes:    [x] Discussion (any 1)  [x] Discussed the discharge plan in detail with case management  including timing/barriers to discharge, need for support services and/or placement decision   [] Discussed management of the case with:        Medications:  Personally reviewed in detail in conjunction w/ labs as documented for evidence of drug toxicity.     Infusion Medications    sodium chloride       Scheduled Medications    vancomycin  1,000 mg IntraVENous Q12H    acetaminophen  650 mg Rectal q8h    cefepime  2,000 mg IntraVENous Q8H    lisinopril  10 mg Oral Daily    sodium chloride flush  5-40 mL IntraVENous 2 times per day     PRN Meds: bisacodyl, sodium chloride flush, sodium chloride, potassium chloride **OR** potassium alternative oral replacement **OR** potassium chloride, magnesium sulfate, ondansetron **OR** ondansetron, polyethylene glycol, acetaminophen **OR** acetaminophen, melatonin     Labs:  Personally reviewed and interpreted for clinical significance.     Recent Labs     04/18/25  1603 04/19/25  0528   WBC 16.1* 9.4   HGB 13.4* 11.6*   HCT 43.1 35.6*    162     Recent Labs     04/18/25  0544 04/19/25  0528 04/20/25  0536    144  144 146*   K 4.8 4.4  4.3 4.9    109  109 109   CO2 20* 27  27 18*   BUN 39* 37*  37* 39*   CREATININE 0.7* 0.6*  0.6* 0.6*   CALCIUM 9.0 9.0  9.1 9.6   MG  --  2.02  --    PHOS  --  2.1*  --      No results for input(s): \"PROBNP\", \"TROPHS\" in the last 72 hours.    No results for input(s): \"LABA1C\" in the last 72 hours.  No results for input(s): \"AST\", \"ALT\", \"BILIDIR\", \"BILITOT\", \"ALKPHOS\" in the last 72 hours.    No results for input(s): \"INR\", \"LACTA\", \"TSH\" in the last 72 hours.      Urine Cultures: No results found for: \"LABURIN\"  Blood Cultures:   Lab Results   Component Value Date/Time    BC  04/17/2025 01:33

## 2025-04-21 NOTE — PROGRESS NOTES
Pt remains confused. Provided oral care with mouthwash and toothette. Pt tolerated fairly well the first time and refused by pushing me away and grabbing the cup the next few times. Pt turned Q2 with pillows. All safety measures in place. Will continue to monitor.

## 2025-04-28 NOTE — PROGRESS NOTES
Physician Progress Note      PATIENT:               ASPEN MARTINEZ  CSN #:                  [id number]  :                       1940  ADMIT DATE:       2025 7:51 PM  DISCH DATE:        2025 3:10 PM  RESPONDING  PROVIDER #:        Ree Rivera MD          QUERY TEXT:    \"Concerns of Sepsis\" is documented discharge notes . Based on your medical   judgment, please clarify the POA status at the time of the order to admit the   patient to inpatient status:    The clinical indicators include:  -WBC=12.6, Lactic Acid=3.3, CRP=38.7  CT \"Bilateral bronchial wall thickening most pronounced within the lower lobes   suggesting bronchitis.  No focal consolidation or pulmonary edema.  No   pneumothorax.  Trace right pleural fluid.\"  Meds-IV Cefepime, IV Vancomycin  Options provided:  -- Sepsis d/t unknown bacterial infection Present on admission  -- Sepsis d/t unknown bacterial infection Not present on admission  -- Other - I will add my own diagnosis  -- Disagree - Not applicable / Not valid  -- Disagree - Clinically unable to determine / Unknown  -- Refer to Clinical Documentation Reviewer    PROVIDER RESPONSE TEXT:    Sepsis d/t unknown bacterial infection Present on admission    Query created by: Bertha White on 2025 5:21 PM      Electronically signed by:  Ree Rivera MD 2025 11:40 AM